# Patient Record
Sex: FEMALE | Race: WHITE | Employment: UNEMPLOYED | URBAN - METROPOLITAN AREA
[De-identification: names, ages, dates, MRNs, and addresses within clinical notes are randomized per-mention and may not be internally consistent; named-entity substitution may affect disease eponyms.]

---

## 2021-09-30 ENCOUNTER — HOSPITAL ENCOUNTER (EMERGENCY)
Age: 31
Discharge: HOME | End: 2021-09-30
Attending: EMERGENCY MEDICINE
Payer: COMMERCIAL

## 2021-09-30 ENCOUNTER — APPOINTMENT (OUTPATIENT)
Dept: CT IMAGING | Age: 31
End: 2021-09-30
Attending: EMERGENCY MEDICINE
Payer: COMMERCIAL

## 2021-09-30 VITALS
HEART RATE: 105 BPM | WEIGHT: 143 LBS | HEIGHT: 62 IN | DIASTOLIC BLOOD PRESSURE: 78 MMHG | BODY MASS INDEX: 26.31 KG/M2 | RESPIRATION RATE: 16 BRPM | OXYGEN SATURATION: 100 % | SYSTOLIC BLOOD PRESSURE: 119 MMHG | TEMPERATURE: 99.1 F

## 2021-09-30 DIAGNOSIS — R10.13 ABDOMINAL PAIN, EPIGASTRIC: ICD-10-CM

## 2021-09-30 DIAGNOSIS — N39.0 URINARY TRACT INFECTION WITHOUT HEMATURIA, SITE UNSPECIFIED: Primary | ICD-10-CM

## 2021-09-30 LAB
ALBUMIN SERPL-MCNC: 3.8 G/DL (ref 3.4–5)
ALBUMIN/GLOB SERPL: 1 {RATIO} (ref 0.8–1.7)
ALP SERPL-CCNC: 82 U/L (ref 45–117)
ALT SERPL-CCNC: 15 U/L (ref 13–56)
AMPHET UR QL SCN: POSITIVE
ANION GAP SERPL CALC-SCNC: 7 MMOL/L (ref 3–18)
APAP SERPL-MCNC: <2 UG/ML (ref 10–30)
APPEARANCE UR: CLEAR
AST SERPL-CCNC: 11 U/L (ref 10–38)
BACTERIA URNS QL MICRO: ABNORMAL /HPF
BARBITURATES UR QL SCN: NEGATIVE
BASOPHILS # BLD: 0 K/UL (ref 0–0.1)
BASOPHILS NFR BLD: 0 % (ref 0–2)
BENZODIAZ UR QL: NEGATIVE
BILIRUB SERPL-MCNC: 0.3 MG/DL (ref 0.2–1)
BILIRUB UR QL: NEGATIVE
BUN SERPL-MCNC: 5 MG/DL (ref 7–18)
BUN/CREAT SERPL: 9 (ref 12–20)
CALCIUM SERPL-MCNC: 9 MG/DL (ref 8.5–10.1)
CANNABINOIDS UR QL SCN: NEGATIVE
CHLORIDE SERPL-SCNC: 102 MMOL/L (ref 100–111)
CO2 SERPL-SCNC: 29 MMOL/L (ref 21–32)
COCAINE UR QL SCN: NEGATIVE
COLOR UR: YELLOW
CREAT SERPL-MCNC: 0.56 MG/DL (ref 0.6–1.3)
DIFFERENTIAL METHOD BLD: ABNORMAL
EOSINOPHIL # BLD: 0 K/UL (ref 0–0.4)
EOSINOPHIL NFR BLD: 0 % (ref 0–5)
EPITH CASTS URNS QL MICRO: ABNORMAL /LPF (ref 0–5)
ERYTHROCYTE [DISTWIDTH] IN BLOOD BY AUTOMATED COUNT: 11.9 % (ref 11.6–14.5)
GLOBULIN SER CALC-MCNC: 4 G/DL (ref 2–4)
GLUCOSE SERPL-MCNC: 92 MG/DL (ref 74–99)
GLUCOSE UR STRIP.AUTO-MCNC: NEGATIVE MG/DL
HCG SERPL QL: NEGATIVE
HCT VFR BLD AUTO: 37.6 % (ref 35–45)
HDSCOM,HDSCOM: ABNORMAL
HGB BLD-MCNC: 13 G/DL (ref 12–16)
HGB UR QL STRIP: NEGATIVE
KETONES UR QL STRIP.AUTO: 80 MG/DL
LEUKOCYTE ESTERASE UR QL STRIP.AUTO: ABNORMAL
LIPASE SERPL-CCNC: 78 U/L (ref 73–393)
LYMPHOCYTES # BLD: 1.8 K/UL (ref 0.9–3.6)
LYMPHOCYTES NFR BLD: 13 % (ref 21–52)
MCH RBC QN AUTO: 28.5 PG (ref 24–34)
MCHC RBC AUTO-ENTMCNC: 34.6 G/DL (ref 31–37)
MCV RBC AUTO: 82.5 FL (ref 78–100)
METHADONE UR QL: NEGATIVE
MONOCYTES # BLD: 1 K/UL (ref 0.05–1.2)
MONOCYTES NFR BLD: 7 % (ref 3–10)
MUCOUS THREADS URNS QL MICRO: ABNORMAL /LPF
NEUTS SEG # BLD: 10.9 K/UL (ref 1.8–8)
NEUTS SEG NFR BLD: 79 % (ref 40–73)
NITRITE UR QL STRIP.AUTO: NEGATIVE
OPIATES UR QL: NEGATIVE
PCP UR QL: NEGATIVE
PH UR STRIP: 5.5 [PH] (ref 5–8)
PLATELET # BLD AUTO: 345 K/UL (ref 135–420)
PMV BLD AUTO: 9.8 FL (ref 9.2–11.8)
POTASSIUM SERPL-SCNC: 3.4 MMOL/L (ref 3.5–5.5)
PROT SERPL-MCNC: 7.8 G/DL (ref 6.4–8.2)
PROT UR STRIP-MCNC: NEGATIVE MG/DL
RBC # BLD AUTO: 4.56 M/UL (ref 4.2–5.3)
RBC #/AREA URNS HPF: ABNORMAL /HPF (ref 0–5)
SALICYLATES SERPL-MCNC: <1.7 MG/DL (ref 2.8–20)
SODIUM SERPL-SCNC: 138 MMOL/L (ref 136–145)
SP GR UR REFRACTOMETRY: 1.02 (ref 1–1.03)
UROBILINOGEN UR QL STRIP.AUTO: 1 EU/DL (ref 0.2–1)
WBC # BLD AUTO: 13.9 K/UL (ref 4.6–13.2)
WBC URNS QL MICRO: ABNORMAL /HPF (ref 0–4)

## 2021-09-30 PROCEDURE — 80179 DRUG ASSAY SALICYLATE: CPT

## 2021-09-30 PROCEDURE — 80307 DRUG TEST PRSMV CHEM ANLYZR: CPT

## 2021-09-30 PROCEDURE — 80143 DRUG ASSAY ACETAMINOPHEN: CPT

## 2021-09-30 PROCEDURE — 85025 COMPLETE CBC W/AUTO DIFF WBC: CPT

## 2021-09-30 PROCEDURE — 74011250636 HC RX REV CODE- 250/636: Performed by: EMERGENCY MEDICINE

## 2021-09-30 PROCEDURE — 96375 TX/PRO/DX INJ NEW DRUG ADDON: CPT

## 2021-09-30 PROCEDURE — 99284 EMERGENCY DEPT VISIT MOD MDM: CPT

## 2021-09-30 PROCEDURE — 83690 ASSAY OF LIPASE: CPT

## 2021-09-30 PROCEDURE — 96374 THER/PROPH/DIAG INJ IV PUSH: CPT

## 2021-09-30 PROCEDURE — 74011000250 HC RX REV CODE- 250: Performed by: EMERGENCY MEDICINE

## 2021-09-30 PROCEDURE — 80053 COMPREHEN METABOLIC PANEL: CPT

## 2021-09-30 PROCEDURE — 74177 CT ABD & PELVIS W/CONTRAST: CPT

## 2021-09-30 PROCEDURE — 96361 HYDRATE IV INFUSION ADD-ON: CPT

## 2021-09-30 PROCEDURE — 84703 CHORIONIC GONADOTROPIN ASSAY: CPT

## 2021-09-30 PROCEDURE — 81001 URINALYSIS AUTO W/SCOPE: CPT

## 2021-09-30 PROCEDURE — 93005 ELECTROCARDIOGRAM TRACING: CPT

## 2021-09-30 PROCEDURE — 74011000636 HC RX REV CODE- 636: Performed by: EMERGENCY MEDICINE

## 2021-09-30 RX ORDER — ONDANSETRON 2 MG/ML
4 INJECTION INTRAMUSCULAR; INTRAVENOUS
Status: COMPLETED | OUTPATIENT
Start: 2021-09-30 | End: 2021-09-30

## 2021-09-30 RX ORDER — FAMOTIDINE 20 MG/1
20 TABLET, FILM COATED ORAL 2 TIMES DAILY
Qty: 10 TABLET | Refills: 0 | Status: SHIPPED | OUTPATIENT
Start: 2021-09-30 | End: 2021-10-05

## 2021-09-30 RX ORDER — NITROFURANTOIN 25; 75 MG/1; MG/1
100 CAPSULE ORAL 2 TIMES DAILY
Qty: 10 CAPSULE | Refills: 0 | Status: SHIPPED | OUTPATIENT
Start: 2021-09-30 | End: 2021-10-05

## 2021-09-30 RX ORDER — ONDANSETRON 4 MG/1
4 TABLET, FILM COATED ORAL
Qty: 12 TABLET | Refills: 0 | Status: SHIPPED | OUTPATIENT
Start: 2021-09-30

## 2021-09-30 RX ADMIN — ONDANSETRON 4 MG: 2 INJECTION INTRAMUSCULAR; INTRAVENOUS at 20:18

## 2021-09-30 RX ADMIN — SODIUM CHLORIDE 1000 ML: 900 INJECTION, SOLUTION INTRAVENOUS at 20:18

## 2021-09-30 RX ADMIN — FAMOTIDINE 20 MG: 10 INJECTION INTRAVENOUS at 20:16

## 2021-09-30 RX ADMIN — IOPAMIDOL 100 ML: 612 INJECTION, SOLUTION INTRAVENOUS at 21:08

## 2021-09-30 NOTE — ED TRIAGE NOTES
Patient presents to ER for C/O epigastric pain x 2 days, states pain is a stabbing pain and she can only tolerate fluids. States she it has been 2 days since she was able to eat.

## 2021-10-01 LAB
ATRIAL RATE: 102 BPM
CALCULATED P AXIS, ECG09: 51 DEGREES
CALCULATED R AXIS, ECG10: 40 DEGREES
CALCULATED T AXIS, ECG11: 18 DEGREES
DIAGNOSIS, 93000: NORMAL
P-R INTERVAL, ECG05: 128 MS
Q-T INTERVAL, ECG07: 328 MS
QRS DURATION, ECG06: 74 MS
QTC CALCULATION (BEZET), ECG08: 427 MS
VENTRICULAR RATE, ECG03: 102 BPM

## 2021-10-01 NOTE — ED NOTES
I have reviewed discharge instructions with the patient. The patient verbalized understanding. Patient armband removed and shredded. Condition stable. Patient education was completed. Patient education was taught to patient using discussion and handout. Patient verbalized understanding. Patient was discharged to home by self. Patient was discharged ambulatory.

## 2021-10-01 NOTE — ED PROVIDER NOTES
EMERGENCY DEPARTMENT HISTORY AND PHYSICAL EXAM    8:09 PM  Date: 9/30/2021  Patient Name: Kimberly Zarate    History of Presenting Illness       History Provided By  [facility]: Kimberly Zarate is a 36 y.o. female with no past medical history presents with epigastric pain that started 2 days ago after accidentally swallowing  chewing gum. Patient states that the pain is sharp, moderate in severity comes and go, is worse when she eats accompanied by nausea. .  Patient had a bowel movement, today passes gas. Patient denies any urinary symptoms. Denies any vaginal discharge. Denies any overdose. PCP: Keke Mason MD    Past History     Past Medical History:  Past Medical History:   Diagnosis Date    Overdose of antipsychotic 3/6/13    Somnolence 3/6/13       Past Surgical History:  No past surgical history on file. Family History:  No family history on file. Social History:  Social History     Tobacco Use    Smoking status: Never Smoker    Smokeless tobacco: Never Used   Substance Use Topics    Alcohol use: No    Drug use: No       Allergies:  No Known Allergies    Review of Systems   Review of Systems   Constitutional: Negative for activity change, appetite change and chills. HENT: Negative for congestion, ear discharge, ear pain and sore throat. Eyes: Negative for photophobia and pain. Respiratory: Negative for cough and choking. Cardiovascular: Negative for palpitations and leg swelling. Gastrointestinal: Positive for abdominal pain. Negative for anal bleeding and rectal pain. Endocrine: Negative for polydipsia and polyuria. Genitourinary: Negative for genital sores and urgency. Musculoskeletal: Negative for arthralgias and myalgias. Neurological: Negative for dizziness, seizures and speech difficulty. Psychiatric/Behavioral: Negative for hallucinations, self-injury and suicidal ideas.         Physical Exam     Patient Vitals for the past 12 hrs:   Temp Pulse Resp BP SpO2 09/30/21 1955 99.1 °F (37.3 °C) (!) 105 16 (!) 135/91 98 %       Physical Exam  Vitals and nursing note reviewed. Constitutional:       Appearance: She is well-developed. HENT:      Head: Normocephalic and atraumatic. Eyes:      General:         Right eye: No discharge. Left eye: No discharge. Cardiovascular:      Rate and Rhythm: Normal rate and regular rhythm. Heart sounds: Normal heart sounds. No murmur heard. Pulmonary:      Effort: Pulmonary effort is normal. No respiratory distress. Breath sounds: Normal breath sounds. No stridor. No wheezing or rales. Chest:      Chest wall: No tenderness. Abdominal:      General: Bowel sounds are normal. There is no distension. Palpations: Abdomen is soft. Tenderness: There is no abdominal tenderness. There is no guarding or rebound. Musculoskeletal:         General: Normal range of motion. Cervical back: Normal range of motion and neck supple. Skin:     General: Skin is warm and dry. Neurological:      Mental Status: She is alert and oriented to person, place, and time. Diagnostic Study Results     Labs -  No results found for this or any previous visit (from the past 12 hour(s)). Radiologic Studies -   No results found. Medical Decision Making     ED Course: Progress Notes, Reevaluation, and Consults:    8:09 PM Initial assessment performed. The patients presenting problems have been discussed, and they/their family are in agreement with the care plan formulated and outlined with them. I have encouraged them to ask questions as they arise throughout their visit.       Provider Notes (Medical Decision Making):   Patient presents with epigastric pain after swallowing chewing gum  Patient will be checked for salicylate and Tylenol since she has a history of antipsychotic overdose given though denies taking any medication  Plan to obtain labs, imaging  Old medical records reviewed:  EKG as interpreted by me: 102, sinus tachycardia, no ST changes, normal intervals  Labs as interpreted by me:  Mild leukocytosis white cell count 13.9 no elevated creatinine  No elevated lipase  UA positive for UTI  Patient received famotidine, IV fluids, antiemetics  Acetaminophen and salicylate levels negative  Feels better on reassessment  Abdomen soft nontender  CT abdomen pelvis with no acute abnormality  Abdomen  Soft non tender on reassessment, patient pain-free  Patient will be discharged with antibiotics for UTI  Patient advised to follow-up with PMD and GI if symptoms persist          Vital Signs-Reviewed the patient's vital signs. Reviewed pt's pulse ox reading. Records Reviewed: old medical records  -I am the first provider for this patient.  -I reviewed the vital signs, available nursing notes, past medical history, past surgical history, family history and social history. Current Facility-Administered Medications   Medication Dose Route Frequency Provider Last Rate Last Admin    famotidine (PF) (PEPCID) 20 mg in 0.9% sodium chloride 10 mL injection  20 mg IntraVENous NOW Rosita Cedeno MD        ondansetron (ZOFRAN) injection 4 mg  4 mg IntraVENous NOW Rosita Cedeno MD        sodium chloride 0.9 % bolus infusion 1,000 mL  1,000 mL IntraVENous ONCE Rosita Cedeno MD            Clinical Impression     Clinical Impression: No diagnosis found. Disposition:  Pt has been reexamined. Patient has no new complaints, changes, or physical findings. Care plan outlined and precautions discussed. Results were reviewed with the patient. All medications were reviewed with the patient; will d/c home with PMD f/u. All of pt's questions and concerns were addressed. Patient was instructed and agrees to follow up with PMD, as well as to return to the ED upon further deterioration. Patient is ready to go home. At this time, patient is stable and appropriate for discharge home.   Patient demonstrates understanding of current diagnoses and is in agreement with the treatment plan. They are advised that while the likelihood of serious underlying condition is low at this point given the evaluation performed today, we cannot fully rule it out. They are advised to immediately return with any new symptoms or worsening of current condition. Any Incidental findings were noted on the patient's discharge paperwork as well as verbally directly to the patient, and the appropriate follow-up was given to the patient as far as instructions on testing needed as well as the timeframe. All questions have been answered. Patient is given educational material regarding their diagnoses, including danger symptoms and when to return to the ED. This note was dictated utilizing voice recognition software which may lead to typographical errors. I apologize in advance if the situation occurs. If questions arise please do not hesitate to contact me or call our department.     Luna Gallagher MD  8:09 PM

## 2022-03-22 ENCOUNTER — APPOINTMENT (OUTPATIENT)
Dept: GENERAL RADIOLOGY | Age: 32
End: 2022-03-22
Attending: PHYSICIAN ASSISTANT
Payer: COMMERCIAL

## 2022-03-22 ENCOUNTER — HOSPITAL ENCOUNTER (EMERGENCY)
Age: 32
Discharge: HOME | End: 2022-03-23
Attending: EMERGENCY MEDICINE
Payer: COMMERCIAL

## 2022-03-22 DIAGNOSIS — S83.92XA SPRAIN OF LEFT KNEE, UNSPECIFIED LIGAMENT, INITIAL ENCOUNTER: ICD-10-CM
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
## 2022-03-22 DIAGNOSIS — S60.222A CONTUSION OF LEFT HAND, INITIAL ENCOUNTER: ICD-10-CM

## 2022-03-22 PROCEDURE — 99284 EMERGENCY DEPT VISIT MOD MDM: CPT

## 2022-03-22 PROCEDURE — 73130 X-RAY EXAM OF HAND: CPT

## 2022-03-22 PROCEDURE — 90471 IMMUNIZATION ADMIN: CPT

## 2022-03-22 PROCEDURE — 74011250636 HC RX REV CODE- 250/636: Performed by: PHYSICIAN ASSISTANT

## 2022-03-22 PROCEDURE — 90715 TDAP VACCINE 7 YRS/> IM: CPT | Performed by: PHYSICIAN ASSISTANT

## 2022-03-22 PROCEDURE — 74011250637 HC RX REV CODE- 250/637: Performed by: PHYSICIAN ASSISTANT

## 2022-03-22 PROCEDURE — 73564 X-RAY EXAM KNEE 4 OR MORE: CPT

## 2022-03-22 RX ORDER — HYDROCODONE BITARTRATE AND ACETAMINOPHEN 5; 325 MG/1; MG/1
1 TABLET ORAL
Status: COMPLETED | OUTPATIENT
Start: 2022-03-22 | End: 2022-03-22

## 2022-03-22 RX ADMIN — TETANUS TOXOID, REDUCED DIPHTHERIA TOXOID AND ACELLULAR PERTUSSIS VACCINE, ADSORBED 0.5 ML: 5; 2.5; 8; 8; 2.5 SUSPENSION INTRAMUSCULAR at 20:33

## 2022-03-22 RX ADMIN — HYDROCODONE BITARTRATE AND ACETAMINOPHEN 1 TABLET: 5; 325 TABLET ORAL at 20:33
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
## 2022-03-23 ENCOUNTER — HOSPITAL ENCOUNTER (EMERGENCY)
Dept: VASCULAR SURGERY | Age: 32
End: 2022-03-23
Attending: PHYSICIAN ASSISTANT
Payer: COMMERCIAL

## 2022-03-23 VITALS
WEIGHT: 150 LBS | DIASTOLIC BLOOD PRESSURE: 94 MMHG | SYSTOLIC BLOOD PRESSURE: 142 MMHG | HEART RATE: 81 BPM | HEIGHT: 62 IN | TEMPERATURE: 98.6 F | OXYGEN SATURATION: 97 % | BODY MASS INDEX: 27.6 KG/M2 | RESPIRATION RATE: 20 BRPM

## 2022-03-23 RX ORDER — NAPROXEN 500 MG/1
500 TABLET ORAL 2 TIMES DAILY WITH MEALS
Qty: 20 TABLET | Refills: 0 | Status: SHIPPED | OUTPATIENT
Start: 2022-03-23

## 2022-11-08 ENCOUNTER — HOSPITAL ENCOUNTER (INPATIENT)
Age: 32
LOS: 5 days | Discharge: HOME | DRG: 751 | End: 2022-11-14
Attending: EMERGENCY MEDICINE | Admitting: PSYCHIATRY & NEUROLOGY
Payer: MEDICAID

## 2022-11-08 DIAGNOSIS — F32.A DEPRESSION, UNSPECIFIED DEPRESSION TYPE: Primary | ICD-10-CM

## 2022-11-08 LAB
ALBUMIN SERPL-MCNC: 4 G/DL (ref 3.4–5)
ALBUMIN/GLOB SERPL: 1 {RATIO} (ref 0.8–1.7)
ALP SERPL-CCNC: 89 U/L (ref 45–117)
ALT SERPL-CCNC: 18 U/L (ref 13–56)
AMORPH CRY URNS QL MICRO: ABNORMAL
AMPHET UR QL SCN: POSITIVE
ANION GAP SERPL CALC-SCNC: 4 MMOL/L (ref 3–18)
APPEARANCE UR: ABNORMAL
AST SERPL-CCNC: 11 U/L (ref 10–38)
BACTERIA URNS QL MICRO: ABNORMAL /HPF
BARBITURATES UR QL SCN: NEGATIVE
BASOPHILS # BLD: 0 K/UL (ref 0–0.1)
BASOPHILS NFR BLD: 0 % (ref 0–2)
BENZODIAZ UR QL: NEGATIVE
BILIRUB SERPL-MCNC: 0.6 MG/DL (ref 0.2–1)
BILIRUB UR QL: NEGATIVE
BUN SERPL-MCNC: 10 MG/DL (ref 7–18)
BUN/CREAT SERPL: 16 (ref 12–20)
CALCIUM SERPL-MCNC: 9.4 MG/DL (ref 8.5–10.1)
CANNABINOIDS UR QL SCN: NEGATIVE
CHLORIDE SERPL-SCNC: 104 MMOL/L (ref 100–111)
CO2 SERPL-SCNC: 29 MMOL/L (ref 21–32)
COCAINE UR QL SCN: NEGATIVE
COLOR UR: ABNORMAL
CREAT SERPL-MCNC: 0.64 MG/DL (ref 0.6–1.3)
DIFFERENTIAL METHOD BLD: NORMAL
EOSINOPHIL # BLD: 0 K/UL (ref 0–0.4)
EOSINOPHIL NFR BLD: 0 % (ref 0–5)
EPITH CASTS URNS QL MICRO: ABNORMAL /LPF (ref 0–5)
ERYTHROCYTE [DISTWIDTH] IN BLOOD BY AUTOMATED COUNT: 12.3 % (ref 11.6–14.5)
ETHANOL SERPL-MCNC: <3 MG/DL (ref 0–3)
FLUAV RNA SPEC QL NAA+PROBE: NOT DETECTED
FLUBV RNA SPEC QL NAA+PROBE: NOT DETECTED
GLOBULIN SER CALC-MCNC: 4 G/DL (ref 2–4)
GLUCOSE SERPL-MCNC: 100 MG/DL (ref 74–99)
GLUCOSE UR STRIP.AUTO-MCNC: NEGATIVE MG/DL
HCG SERPL QL: NEGATIVE
HCT VFR BLD AUTO: 40.3 % (ref 35–45)
HDSCOM,HDSCOM: ABNORMAL
HGB BLD-MCNC: 13.6 G/DL (ref 12–16)
HGB UR QL STRIP: NEGATIVE
IMM GRANULOCYTES # BLD AUTO: 0 K/UL (ref 0–0.04)
IMM GRANULOCYTES NFR BLD AUTO: 0 % (ref 0–0.5)
KETONES UR QL STRIP.AUTO: ABNORMAL MG/DL
LEUKOCYTE ESTERASE UR QL STRIP.AUTO: ABNORMAL
LYMPHOCYTES # BLD: 3.1 K/UL (ref 0.9–3.6)
LYMPHOCYTES NFR BLD: 27 % (ref 21–52)
MCH RBC QN AUTO: 28.6 PG (ref 24–34)
MCHC RBC AUTO-ENTMCNC: 33.7 G/DL (ref 31–37)
MCV RBC AUTO: 84.7 FL (ref 78–100)
METHADONE UR QL: NEGATIVE
MONOCYTES # BLD: 0.8 K/UL (ref 0.05–1.2)
MONOCYTES NFR BLD: 7 % (ref 3–10)
MUCOUS THREADS URNS QL MICRO: ABNORMAL /LPF
NEUTS SEG # BLD: 7.6 K/UL (ref 1.8–8)
NEUTS SEG NFR BLD: 66 % (ref 40–73)
NITRITE UR QL STRIP.AUTO: NEGATIVE
NRBC # BLD: 0 K/UL (ref 0–0.01)
NRBC BLD-RTO: 0 PER 100 WBC
OPIATES UR QL: NEGATIVE
PCP UR QL: NEGATIVE
PH UR STRIP: 5.5 [PH] (ref 5–8)
PLATELET # BLD AUTO: 408 K/UL (ref 135–420)
PMV BLD AUTO: 9.6 FL (ref 9.2–11.8)
POTASSIUM SERPL-SCNC: 3.3 MMOL/L (ref 3.5–5.5)
PROT SERPL-MCNC: 8 G/DL (ref 6.4–8.2)
PROT UR STRIP-MCNC: ABNORMAL MG/DL
RBC # BLD AUTO: 4.76 M/UL (ref 4.2–5.3)
RBC #/AREA URNS HPF: ABNORMAL /HPF (ref 0–5)
SARS-COV-2, COV2: NOT DETECTED
SODIUM SERPL-SCNC: 137 MMOL/L (ref 136–145)
SP GR UR REFRACTOMETRY: 1.03 (ref 1–1.03)
UROBILINOGEN UR QL STRIP.AUTO: 1 EU/DL (ref 0.2–1)
WBC # BLD AUTO: 11.7 K/UL (ref 4.6–13.2)
WBC URNS QL MICRO: ABNORMAL /HPF (ref 0–4)

## 2022-11-08 PROCEDURE — 82077 ASSAY SPEC XCP UR&BREATH IA: CPT

## 2022-11-08 PROCEDURE — 80307 DRUG TEST PRSMV CHEM ANLYZR: CPT

## 2022-11-08 PROCEDURE — 90791 PSYCH DIAGNOSTIC EVALUATION: CPT

## 2022-11-08 PROCEDURE — 84703 CHORIONIC GONADOTROPIN ASSAY: CPT

## 2022-11-08 PROCEDURE — 74011250637 HC RX REV CODE- 250/637: Performed by: EMERGENCY MEDICINE

## 2022-11-08 PROCEDURE — 99285 EMERGENCY DEPT VISIT HI MDM: CPT

## 2022-11-08 PROCEDURE — 80053 COMPREHEN METABOLIC PANEL: CPT

## 2022-11-08 PROCEDURE — 87636 SARSCOV2 & INF A&B AMP PRB: CPT

## 2022-11-08 PROCEDURE — 85025 COMPLETE CBC W/AUTO DIFF WBC: CPT

## 2022-11-08 PROCEDURE — 81001 URINALYSIS AUTO W/SCOPE: CPT

## 2022-11-08 RX ORDER — POTASSIUM CHLORIDE 20 MEQ/1
40 TABLET, EXTENDED RELEASE ORAL
Status: COMPLETED | OUTPATIENT
Start: 2022-11-08 | End: 2022-11-08

## 2022-11-08 RX ADMIN — POTASSIUM CHLORIDE 40 MEQ: 1500 TABLET, EXTENDED RELEASE ORAL at 20:43

## 2022-11-08 NOTE — ED TRIAGE NOTES
Patient arrived her by self for SI,states after son left states plan to \"take all her medication\", patient also has been cutting herself with a \"daniel\" razor noted superficial cuts abdomen. Patient cooperative and calm.  Will obtain labs at at time

## 2022-11-09 PROBLEM — F32.A DEPRESSIVE DISORDER: Status: ACTIVE | Noted: 2022-11-09

## 2022-11-09 PROCEDURE — 74011250637 HC RX REV CODE- 250/637: Performed by: PSYCHIATRY & NEUROLOGY

## 2022-11-09 PROCEDURE — 65220000003 HC RM SEMIPRIVATE PSYCH

## 2022-11-09 RX ORDER — BENZTROPINE MESYLATE 1 MG/1
1 TABLET ORAL
Status: DISCONTINUED | OUTPATIENT
Start: 2022-11-09 | End: 2022-11-14 | Stop reason: HOSPADM

## 2022-11-09 RX ORDER — HALOPERIDOL 5 MG/1
5 TABLET ORAL
Status: DISCONTINUED | OUTPATIENT
Start: 2022-11-09 | End: 2022-11-14 | Stop reason: HOSPADM

## 2022-11-09 RX ORDER — ACETAMINOPHEN 325 MG/1
650 TABLET ORAL
Status: DISCONTINUED | OUTPATIENT
Start: 2022-11-09 | End: 2022-11-14 | Stop reason: HOSPADM

## 2022-11-09 RX ORDER — TRAZODONE HYDROCHLORIDE 50 MG/1
50 TABLET ORAL
Status: DISCONTINUED | OUTPATIENT
Start: 2022-11-09 | End: 2022-11-14 | Stop reason: HOSPADM

## 2022-11-09 RX ORDER — HYDROXYZINE PAMOATE 25 MG/1
25 CAPSULE ORAL
Status: DISCONTINUED | OUTPATIENT
Start: 2022-11-09 | End: 2022-11-14 | Stop reason: HOSPADM

## 2022-11-09 RX ADMIN — TRAZODONE HYDROCHLORIDE 50 MG: 50 TABLET ORAL at 20:12

## 2022-11-09 RX ADMIN — HYDROXYZINE PAMOATE 25 MG: 25 CAPSULE ORAL at 20:12
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
## 2022-11-09 NOTE — ED NOTES
TRANSFER - OUT REPORT:    Verbal report given to Valery Estevez (name) on Bonnie Vincent  being transferred to Bed 126/2(unit) for routine progression of care       Report consisted of patients Situation, Background, Assessment and   Recommendations(SBAR). Information from the following report(s) SBAR, ED Summary and MAR was reviewed with the receiving nurse. Lines:       Opportunity for questions and clarification was provided.       Patient awaiting transport and will go up at [phone]

## 2022-11-09 NOTE — BSMART NOTE
Crisis Note: Patient has been accepted for inpatient treatment at [facility] by Dr. Ulysses Amsterdam.

## 2022-11-09 NOTE — ACP (ADVANCE CARE PLANNING)
Advance Care Planning   Advance Care Planning Inpatient Note  [address]    Today's Date: 11/9/2022  Unit: [facility] EMERGENCY DEPT    Received request from . Upon review of chart and communication with care team, patient's decision making abilities are not in question. Patient was/were present in the room during visit. Goals of ACP Conversation:  Discuss Advance Care planning documents    Health Care Decision Makers:    No healthcare decision makers have been documented. Click here to complete [address] including selection of the Healthcare Decision Maker Relationship (ie \"Primary\")  Summary:  No Decision Maker named by patient at this time    Advance Care Planning Documents (Patient Wishes) on file:  None     Assessment:    Patient seen as a new admit to the hospital in from bed 20 to the behavioral med unit. Patient awaiting clearance and admission orders, There is no advance directive.     Interventions:  Deferred conversation as patient not interested in completing an advance directive at this time    Care Preferences Communicated:  No    Outcomes/Plan:      [facility] on 11/9/2022 at 11:12 AM

## 2022-11-09 NOTE — ED NOTES
Patient brought back to room and changed into paper scrubs. Patient has 2 bags of belongings and they are placed in locker 5A.

## 2022-11-09 NOTE — BSMART NOTE
Behavioral Health Crisis Assessment    Chief Complaint: Mental Health/ SI       Voluntary or Involuntary Status: Voluntarily      C-SSRS current suicide Risk (High, Moderate, Low): high      Past Suicide Attempts:  (specify): Patient stated, \"In 2012 or 2013, I tried to overdosed on medications\". Self Injurious/Self Mutilation behaviors (specify): Patient does cut herself to stop/numb the pain. Protective Factors (specify): \"My mother or my friend Carmencita\". Risk Factors (specify): Mental health, self-mutilation       Substance use (current or past):  Denied. MH & Substance use Treatment  (current and/or past):Denied. Violence towards others (current and/or past:(specify): Denied. Legal issues (current or past): Denied. Access to weapons: Patient stated she had a razor blade. Trauma or Abuse: (specify): Denied      Living Situation: Patient lives with mother and brother. Employment: Patient is currently unemployed. Education level: Patient has a 10th grade education      Mental Status Exam: Patient presented as appropriate, alert and oriented to person, place, time and situation. Patient is wearing hospital paper scrubs. Patient had appropriate posture, Good eye contact and presented with cooperative attitude, Euthymic  mood and Helpless and Hopeless affect. Thought process was Clear and Coherent with No evidence of impairment content. Memory shows no evidence of impairment. Patient's speech was Slow and Soft. Judgement is Fair and insight is fair. Brief Clinical Summary: Patient is a 28 y/o Female. Patient arrived to [facility] ED via EMS due to suicidal ideation and self-mutilating behavior. Pt endorses SI. Pt denied HI/AH/VH/lacked evidence of delusions. Patient reported that she has been feeling suicidal on and off and has thoughts about sleeping aid medications. Patient does not take any medications. Patient does not have a PCP. Patient kitchen not have any outpatient services in place. Patient reported that she does have difficulties with with sleep and her eating habits/appetite is good. Patient did not have any questions at the end of the assessment. Disposition: Patient is receptive to inpatient treatment.  Will discuss with on-call psychiatrist.

## 2022-11-09 NOTE — PROGRESS NOTES
completed the initial Spiritual Assessment of the patient in bed 20 of the emergency room as she waits for clearance and admission orders to the behavioral med unit. Patient was asked about her having an advance directive and she said no. Patient does not have any Zoroastrian/cultural needs that will affect patients preferences in health care. Chaplains will continue to follow and will provide pastoral care on an as needed/requested basis.     Yury Santiago  [facility] Care Department  [phone]

## 2022-11-10 PROBLEM — F41.0 GENERALIZED ANXIETY DISORDER WITH PANIC ATTACKS: Status: ACTIVE | Noted: 2022-11-10

## 2022-11-10 PROBLEM — F41.0 PANIC DISORDER: Status: RESOLVED | Noted: 2022-11-10 | Resolved: 2022-11-10

## 2022-11-10 PROBLEM — F41.0 PANIC DISORDER: Status: ACTIVE | Noted: 2022-11-10

## 2022-11-10 PROBLEM — F32.2 MAJOR DEPRESSIVE DISORDER, SINGLE EPISODE, SEVERE WITHOUT PSYCHOTIC FEATURES (HCC): Status: ACTIVE | Noted: 2022-11-09

## 2022-11-10 PROBLEM — F41.1 GENERALIZED ANXIETY DISORDER WITH PANIC ATTACKS: Status: ACTIVE | Noted: 2022-11-10

## 2022-11-10 PROCEDURE — 74011250637 HC RX REV CODE- 250/637: Performed by: PSYCHIATRY & NEUROLOGY

## 2022-11-10 PROCEDURE — 99223 1ST HOSP IP/OBS HIGH 75: CPT | Performed by: PSYCHIATRY & NEUROLOGY

## 2022-11-10 PROCEDURE — 65220000003 HC RM SEMIPRIVATE PSYCH

## 2022-11-10 RX ORDER — PAROXETINE HYDROCHLORIDE 20 MG/1
20 TABLET, FILM COATED ORAL DAILY
Status: DISCONTINUED | OUTPATIENT
Start: 2022-11-10 | End: 2022-11-14 | Stop reason: HOSPADM

## 2022-11-10 RX ADMIN — PAROXETINE HYDROCHLORIDE 20 MG: 20 TABLET, FILM COATED ORAL at 14:12

## 2022-11-10 RX ADMIN — TRAZODONE HYDROCHLORIDE 50 MG: 50 TABLET ORAL at 20:49

## 2022-11-10 NOTE — BH NOTES
Pt appeared to have slept for 6 hours thus far. Will continue to monitor for safety and changes in behavior.

## 2022-11-10 NOTE — H&P
[address] [facility] [address]  Inpatient Admission Note    Date of Service:  11/10/22    Historian(s): Halina Rivera and chart review  Referral Source: [facility]    Chief Complaint   Suicidal ideation    History of Present Illness     Halina Rivera is a 29 y.o. WHITE/NON- female with a history of ADHD who was admitted voluntarily from our ED for suicidal ideation with a plan to cut herself. The patient has a history of cutting behavior and has been inflicting superficial lacerations on her upper extremities and abdomen. The patient is a limited historian and is very guarded with information. She made several conflicting statements during the interview. She is requesting to be discharged. Attempts were made to get collateral from her mother but her mother was also a poor historian. Her aunt provided some collateral information. According to ED notes, the patient presented to the ED endorsing suicidal ideation because she felt like everybody was against her. She said her mother was a major stressor in her life and that she was having anxiety and panic attacks. She reported a plan to overdose on medications. She also said that she had a cutter and had cut her left arm and abdomen. Collateral information from aunt indicates that the patient has been cutting herself for quite a while. The family first noted scars of cutting in the summertime. They feel that she has had an increase in cutting behavior. Aunt reports that she is more depressed and isolative. She is also more irritable and has not been sleeping well. Aunt said that the patient has panic attacks at night and she does not know if the panic attacks are inducing the cutting. Aunt reported that the patient is usually very guarded and quiet. She has not been working due to anxiety as she says she cannot be around people. In fact she has never had any job.   The patient's mother reported that the patient is depressed and withdrawn. She spends a lot of time in her room looking out the window and needs to be medicated for depression. The patient herself denied feeling depressed. She says she has been feeling \"more happy and hyper\" the past few months. She feels stressed out at times due to having to help her mother with her ADLs and states that being her mother's caregiver gets overwhelming at times. She is also worried about finances and bills. The patient was vague about when she started cutting but she says she last cut herself 2 weeks ago. She denied any other stressors. She reported poor sleep for the past 2 years since she broke up with her boyfriend. She says she states she gets only about 4 to 5 hours of sleep at night. She denied problems with appetite or energy level. She reported problems with concentration but states she has been diagnosed with ADHD in childhood. She endorsed irritability. The patient was screened for luis fernando but it was very difficult to get a history from her. She endorsed symptoms of sometimes being very talkative but said it only lasted a few hours. She said her brother had informed her that she was hyper and that she can get hyper at times. Her aunt was also asked about manic symptoms and did not endorse any manic symptoms. The aunt mentioned that the patient has been having panic attacks for the past 2 years. The patient herself did not want to disclose much about anxiety or panic attacks. The patient denies use of recreational drugs or alcohol although her UDS was positive for amphetamines. She says she has been prescribed Ritalin in childhood but had not taken it in a number of years. Medical Review of Systems     Constitutional: No fever or chills. All other systems reviewed and are negative except for what is mentioned in the HPI. Psychiatric Treatment History   This is the patient's first psychiatric hospitalization and she denies history of prior suicide attempts. However, she informed the crisis worker that she had tried to overdose on medications in 2012 or 2013. She denies outpatient treatment. She says she recently contacted an outpatient clinic to get an appointment in order to establish care but she has not been given the appointment yet and has not established care with anybody. Allergies    No Known Allergies    Medical History     Past Medical History:   Diagnosis Date    Overdose of antipsychotic 3/6/13    Somnolence 3/6/13        Medication(s)     Prior to Admission Medications   Prescriptions Last Dose Informant Patient Reported? Taking?   naproxen (NAPROSYN) 500 mg tablet Unknown  No No   Sig: Take 1 Tablet by mouth two (2) times daily (with meals). ondansetron hcl (Zofran) 4 mg tablet Unknown  No No   Sig: Take 1 Tablet by mouth every eight (8) hours as needed for Nausea. Facility-Administered Medications: None       Family History     No family history on file. Psychiatric Family History  Mother with history of depression and anxiety, causing schizoaffective disorder  Social History     The patient was born in [place] and raised in [place]. She says she is an only child but she grew up with another child that she currently calls her brother even though he is not her biological brother. She went as far as the 10th grade due to having a learning disability. She has never been employed. She is single and does not have any children. She currently lives with her mother and \"brother\" in [place].   Vitals/Labs      Vitals:    11/09/22 0306 11/09/22 0730 11/09/22 1659 11/10/22 0923   BP: 122/75 107/71 115/83 114/76   Pulse: 100 97 (!) 110 100   Resp: 18 18 18 16   Temp: 97.5 °F (36.4 °C) 98.5 °F (36.9 °C) 97.2 °F (36.2 °C) 97.2 °F (36.2 °C)   SpO2: 99% 99% 97% 97%   Weight:       Height:           Labs:   Results for orders placed or performed during the hospital encounter of 11/08/22   COVID-19 WITH INFLUENZA A/B   Result Value Ref Range SARS-CoV-2 by PCR Not detected NOTD      Influenza A by PCR Not detected NOTD      Influenza B by PCR Not detected NOTD     CBC WITH AUTOMATED DIFF   Result Value Ref Range    WBC 11.7 4.6 - 13.2 K/uL    RBC 4.76 4.20 - 5.30 M/uL    HGB 13.6 12.0 - 16.0 g/dL    HCT 40.3 35.0 - 45.0 %    MCV 84.7 78.0 - 100.0 FL    MCH 28.6 24.0 - 34.0 PG    MCHC 33.7 31.0 - 37.0 g/dL    RDW 12.3 11.6 - 14.5 %    PLATELET 478 827 - 207 K/uL    MPV 9.6 9.2 - 11.8 FL    NRBC 0.0 0  WBC    ABSOLUTE NRBC 0.00 0.00 - 0.01 K/uL    NEUTROPHILS 66 40 - 73 %    LYMPHOCYTES 27 21 - 52 %    MONOCYTES 7 3 - 10 %    EOSINOPHILS 0 0 - 5 %    BASOPHILS 0 0 - 2 %    IMMATURE GRANULOCYTES 0 0.0 - 0.5 %    ABS. NEUTROPHILS 7.6 1.8 - 8.0 K/UL    ABS. LYMPHOCYTES 3.1 0.9 - 3.6 K/UL    ABS. MONOCYTES 0.8 0.05 - 1.2 K/UL    ABS. EOSINOPHILS 0.0 0.0 - 0.4 K/UL    ABS. BASOPHILS 0.0 0.0 - 0.1 K/UL    ABS. IMM. GRANS. 0.0 0.00 - 0.04 K/UL    DF AUTOMATED     METABOLIC PANEL, COMPREHENSIVE   Result Value Ref Range    Sodium 137 136 - 145 mmol/L    Potassium 3.3 (L) 3.5 - 5.5 mmol/L    Chloride 104 100 - 111 mmol/L    CO2 29 21 - 32 mmol/L    Anion gap 4 3.0 - 18 mmol/L    Glucose 100 (H) 74 - 99 mg/dL    BUN 10 7.0 - 18 MG/DL    Creatinine 0.64 0.6 - 1.3 MG/DL    BUN/Creatinine ratio 16 12 - 20      eGFR >60 >60 ml/min/1.73m2    Calcium 9.4 8.5 - 10.1 MG/DL    Bilirubin, total 0.6 0.2 - 1.0 MG/DL    ALT (SGPT) 18 13 - 56 U/L    AST (SGOT) 11 10 - 38 U/L    Alk.  phosphatase 89 45 - 117 U/L    Protein, total 8.0 6.4 - 8.2 g/dL    Albumin 4.0 3.4 - 5.0 g/dL    Globulin 4.0 2.0 - 4.0 g/dL    A-G Ratio 1.0 0.8 - 1.7     URINALYSIS W/ RFLX MICROSCOPIC   Result Value Ref Range    Color DARK YELLOW      Appearance CLOUDY      Specific gravity 1.030 1.005 - 1.030      pH (UA) 5.5 5.0 - 8.0      Protein TRACE (A) NEG mg/dL    Glucose Negative NEG mg/dL    Ketone TRACE (A) NEG mg/dL    Bilirubin Negative NEG      Blood Negative NEG      Urobilinogen 1.0 0.2 - 1.0 EU/dL    Nitrites Negative NEG      Leukocyte Esterase LARGE (A) NEG     ETHYL ALCOHOL   Result Value Ref Range    ALCOHOL(ETHYL),SERUM <3 0 - 3 MG/DL   HCG QL SERUM   Result Value Ref Range    HCG, Ql. Negative NEG     DRUG SCREEN, URINE   Result Value Ref Range    BENZODIAZEPINES Negative NEG      BARBITURATES Negative NEG      THC (TH-CANNABINOL) Negative NEG      OPIATES Negative NEG      PCP(PHENCYCLIDINE) Negative NEG      COCAINE Negative NEG      AMPHETAMINES Positive (A) NEG      METHADONE Negative NEG      HDSCOM (NOTE)    URINE MICROSCOPIC ONLY   Result Value Ref Range    WBC 3 to 5 0 - 4 /hpf    RBC NONE 0 - 5 /hpf    Epithelial cells 4+ 0 - 5 /lpf    Bacteria 1+ (A) NEG /hpf    Mucus 4+ (A) NEG /lpf    Amorphous Crystals FEW (A) NEG         Mental Status Examination     Appearance/Hygiene 29 y.o.  WHITE/NON- female  Hygiene: Fair, dressed in hospital scrubs   Behavior/Social Relatedness Appropriate but guarded   Musculoskeletal Gait/Station: appropriate  Tone (flaccid, cogwheeling, spastic): not assessed  Psychomotor (hyperkinetic, hypokinetic): calm  Involuntary movements (tics, dyskinesias, akathisa, stereotypies): none   Speech   Rate, rhythm, volume, fluency and articulation are appropriate   Mood   euthymic   Affect    within normal limits   Thought Process Linear and concrete   Thought Content and Perceptual Disturbances Denies delusions, ideas of reference, overvalued ideas, ruminations, obsession, compulsions, and phobias    Denies self-injurious behavior (SIB), suicidal ideation (SI), aggressive behavior or homicidal ideation (HI)    Denies auditory and visual hallucinations   Sensorium and Cognition  A&Ox4, attention intact, memory intact, language use appropriate, and fund of knowledge age appropriate   Insight  limited   Judgment limited       Suicide Risk Assessment     Admission  Date/Time: 11/10/22  Risk factors for suicidality depressed mood, self-injurious behavior, history of prior suicide attempt. The patient currently denies suicidal ideation but is still deemed to be at least at a moderate acute risk of suicide. Assessment and Plan     Psychiatric Diagnoses:   Patient Active Problem List   Diagnosis Code    Major depressive disorder, single episode, severe without psychotic features ([facility] [address] F32.2    Generalized anxiety disorder with panic attacks F41.1, F41.0       Level of impairment/disability: Moderate to severe    Constantine Renae is a 29 y.o. who is currently requiring acute stabilization after endorsing suicidal ideation. Admit to locked inpatient behavioral health unit. Start milieu, group, art and occupation therapy. Start Paxil 20 mg daily for depression and anxiety. Routine labs ordered and reviewed by this provider. Reviewed instructions, risks, benefits and side effects. Start disposition planning; verify upcoming outpatient appointments with therapist and/or psychiatric medication prescriber. Tentative date of discharge: 3-5 days.        Clive Edwards MD  Psychiatrist  [facility]

## 2022-11-10 NOTE — H&P
Psychiatry History and Physical    Subjective:     Date of Evaluation:  11/10/2022    Reason for Referral:  Ariel Flores was referred to the examiners from ED for SI. History of Presenting Problem: 28 yo cauc female in NAD, well developed and nourished with hx of NATALIE and MDD who presented to the ED for SI. She endorses depression and anxiety. She denies SI, HI, AH, and VH. She denies physical symptoms today. Patient Active Problem List    Diagnosis Date Noted    Generalized anxiety disorder with panic attacks 11/10/2022    Major depressive disorder, single episode, severe without psychotic features ([facility] [address] 11/09/2022     Past Medical History:   Diagnosis Date    Overdose of antipsychotic 3/6/13    Somnolence 3/6/13     No past surgical history on file. No family history on file. Social History     Tobacco Use    Smoking status: Never    Smokeless tobacco: Never   Substance Use Topics    Alcohol use: No     Prior to Admission medications    Medication Sig Start Date End Date Taking? Authorizing Provider   naproxen (NAPROSYN) 500 mg tablet Take 1 Tablet by mouth two (2) times daily (with meals). 3/23/22   LUCY Smyth   ondansetron hcl (Zofran) 4 mg tablet Take 1 Tablet by mouth every eight (8) hours as needed for Nausea.  9/30/21   Burak Hale MD     No Known Allergies     Review of Systems - History obtained from chart review and the patient  General ROS: negative  Psychological ROS: positive for - anxiety and depression  Ophthalmic ROS: negative  ENT ROS: negative  Allergy and Immunology ROS: negative  Hematological and Lymphatic ROS: negative  Endocrine ROS: negative  Respiratory ROS: no cough, shortness of breath, or wheezing  Cardiovascular ROS: no chest pain or dyspnea on exertion  Gastrointestinal ROS: no abdominal pain, change in bowel habits, or black or bloody stools  Genito-Urinary ROS: no dysuria, trouble voiding, or hematuria  Musculoskeletal ROS: negative  Neurological ROS: no TIA or stroke symptoms  Dermatological ROS: negative      Objective:     No data found. Mental Status exam: WNL except for    Sensorium  Alert and Oriented x 2   Orientation person and place   Relations cooperative   Eye Contact appropriate   Appearance:  age appropriate   Motor Behavior:  within normal limits   Speech:  normal pitch, normal volume, and non-pressured   Vocabulary average   Thought Process: within normal limits   Thought Content free of delusions and free of hallucinations   Suicidal ideations no plan  and no intention   Homicidal ideations no plan  and no intention   Mood:  anxious   Affect:  mood-congruent   Memory recent  adequate   Memory remote:  adequate   Concentration:  adequate   Abstraction:  concrete   Insight:  good   Reliability good   Judgment:  good         Physical Exam:   Visit Vitals  /76 (BP 1 Location: Left upper arm, BP Patient Position: Sitting)   Pulse 100   Temp 97.2 °F (36.2 °C)   Resp 16   Ht 5' 1\" (1.549 m)   Wt 70.3 kg (155 lb)   SpO2 97%   Breastfeeding No   BMI 29.29 kg/m²     General:  Alert, cooperative, no distress, appears stated age. Head:  Normocephalic, without obvious abnormality, atraumatic. Eyes:  Conjunctivae/corneas clear. PERRL, EOMs intact. Fundi benign. Ears:  Normal TMs and external ear canals both ears. Nose: Nares normal. Septum midline. Mucosa normal. No drainage or sinus tenderness. Throat: Lips, mucosa, and tongue normal. Teeth and gums normal.   Neck: Supple, symmetrical, trachea midline, no adenopathy, thyroid: no enlargement/tenderness/nodules, no carotid bruit and no JVD. Back:   Symmetric, no curvature. ROM normal. No CVA tenderness. Lungs:   Clear to auscultation bilaterally. Chest wall:  No tenderness or deformity. Heart:  Regular rate and rhythm, S1, S2 normal, no murmur, click, rub or gallop. Abdomen:   Soft, non-tender. Bowel sounds normal. No masses,  No organomegaly.            Extremities: Extremities normal, atraumatic, no cyanosis or edema. Pulses: 2+ and symmetric all extremities. Skin: Skin color, texture, turgor normal. No rashes or lesions. Lymph nodes: Cervical, supraclavicular, and axillary nodes normal.   Neurologic: CNII-XII intact. Normal strength, sensation and reflexes throughout. Impression:      Principal Problem:    Major depressive disorder, single episode, severe without psychotic features ([facility] [address] (11/9/2022)    Active Problems:    Generalized anxiety disorder with panic attacks (11/10/2022)          Plan:     Recommendations for Treatment/Conditions:  Psychiatric treatment recommended while in hospital  Admit to behavioral health for MDD and NATALIE. Referral To:    Inpatient psychiatric care      [place], [place]   11/10/2022 6:19 PM

## 2022-11-10 NOTE — GROUP NOTE
MAXINE  GROUP DOCUMENTATION INDIVIDUAL                                                                          Group Therapy Note    Date: 11/9/2022    Group Start Time: 1945  Group End Time: 2015  Group Topic: Medication    [facility] ADULT CHEM TU Bojorquez RN    IP [address] [facility] GROUP DOCUMENTATION GROUP    Group Therapy Note    Attendees: 5       Attendance: Attended    Patient's Goal:  Understanding the importance of medication compliance. Interventions/techniques: Informed    Follows Directions: Followed directions    Interactions: Interacted appropriately    Mental Status: Calm    Behavior/appearance: Cooperative    Goals Achieved:  Identified feelings        Sonal Maria RN

## 2022-11-10 NOTE — BH NOTES
Presented from ED with ED personnel, security at   18:40. Oriented to the unit. Signed consent for treatment and other admission paperwork. Denies current SI/HI/AVH or any harmful intent. Reports this is her first admission to a mental health facility and that she has no outpatient providers. Self diagnisis of anxiety disorder and panic attacks for which she has never had treatment or medications prescribed for this diagnosis. Indicates at some time someone gave her a diagnosis but she is not sure what this was and she is not on any medications not even sure where this happened. UDS + amphetamines but she denies knowledge of this or what could make her positive for this. Denies any drug use. \"Maybe it is the sleep aid I take at night but it is over the counter\". Belongings have yet to be inventoried. Report given to the 7P night shift. Reported her mother is a major stressor in her life. She lives with mother and brother. Called mother crying and apologizing for her behaviors and said he wanted to come home that she \"does not feel comfortable\" here and is hoping to be able to leave tomorrow. Monitor safety with Q 15 min checks  Continue to morton information from patient to complete assessment and plan of care. She engaged into a relationship with someone over this month and she got scammed out of $800 which she sent to this woman and it her mothers money which she sent this woman via [address]    Mother told her se could come home tomorrow and she would come pick her up if she is allowed to leave.

## 2022-11-10 NOTE — BH NOTES
EDILSON Note: The above pt has been alert and cooperative this shift. She has been isolative during this shift. She has not been a management problem this shift. She has been compliant with medications this shift. She did attend groups this shift. She has not experienced any falls this shift. She has a flat affect with a depressed mood during this shift.

## 2022-11-10 NOTE — MED STUDENT NOTES
Subjective    Sherice Rodrigues is a 29 y.o. female  with PMH of ADHD to the ED with her aunt due to suicidal ideations which are not currently present. Patient inconsistently claims this is the first time but mentions she has had suicidal ideation before. States Plan was with pills but no intention to follow through. At the time patient states she was overwhelmed with bills and her aunt as mad at her for taking money and losing it to a stranger on the internet. She never had a job but states she helps at home with her mothers diabetes and state she receives money from her ex as they are still good friends. She recently attempted to contact as psychiatrist whose number she was given by her friend for herself but never got in contact. Patient grew up in [place] and was born in [place]. Completed school till the 10th grade but stopped due to her ADHD condition. She was taking ritalin but does not know when she stopped. No justina    PMH  ADHD, unknown when diagnosed, use to take ritalin        PSH  Tonsillectomy  Cyst removal of neck  [place] teeth removed    No complications with any surgerys    Medications  Ritalin - ADHD, unknown when she started and stopped        ROS        Objective  Visit Vitals  /76 (BP 1 Location: Left upper arm, BP Patient Position: Sitting)   Pulse 100   Temp 97.2 °F (36.2 °C)   Resp 16   Ht 5' 1\" (1.549 m)   Wt 70.3 kg (155 lb)   SpO2 97%   Breastfeeding No   BMI 29.29 kg/m²       No results found for this or any previous visit (from the past 12 hour(s)). Physical Exam            Assessment        Problem List    ICD-10-CM ICD-9-CM    1. Depression, unspecified depression type  F32. A 311                 Plan    I have discussed the plan with the patient, the patient understand and agrees. *ATTENTION:  This note has been created by a medical student for educational purposes only.   Please do not refer to the content of this note for clinical decision-making, billing, or other purposes. Please see attending physicians note to obtain clinical information on this patient. *

## 2022-11-10 NOTE — BSMART NOTE
ART THERAPY GROUP PROGRESS NOTE    Group time:9:45    The patient did not awaken/get up when called for group.

## 2022-11-10 NOTE — BSMART NOTE
SOCIAL WORK GROUP THERAPY PROGRESS NOTE    Group Time:  10:15am    Group Topic:  Coping Skills    Group Participation:     Pt expressed  interested & paid attention during session despite dysphoric mood. No self disclosure.

## 2022-11-11 PROCEDURE — 74011250637 HC RX REV CODE- 250/637: Performed by: PSYCHIATRY & NEUROLOGY

## 2022-11-11 PROCEDURE — 65220000003 HC RM SEMIPRIVATE PSYCH

## 2022-11-11 PROCEDURE — 99232 SBSQ HOSP IP/OBS MODERATE 35: CPT | Performed by: PSYCHIATRY & NEUROLOGY

## 2022-11-11 RX ADMIN — PAROXETINE HYDROCHLORIDE 20 MG: 20 TABLET, FILM COATED ORAL at 10:26

## 2022-11-11 RX ADMIN — HYDROXYZINE PAMOATE 25 MG: 25 CAPSULE ORAL at 20:42

## 2022-11-11 RX ADMIN — TRAZODONE HYDROCHLORIDE 50 MG: 50 TABLET ORAL at 20:42

## 2022-11-11 NOTE — PROGRESS NOTES
[address] [facility] [address]  Inpatient Progress Note     Date of Service: 11/11/22  Hospital Day: 2     Subjective/Interval History   11/11/22    Treatment Team Notes:  Notes reviewed and/or discussed and report that Gama Parada is a 82-year-old female admitted for suicidal ideation. No acute issues overnight. The patient slept almost 7 hours and she has been pleasant and cooperative on the unit. Patient interview: Gama Parada was interviewed by this writer today. The patient denied complaints and reported euthymic mood. We obtained some more history surrounding the circumstances leading to her hospitalization. The patient said that her aunt was upset with her because she had fallen victim to a scam online and had spent $800 of her mother's money. She says that was the reason the aunt called the police to bring her to the hospital.  She says she was not endorsing suicidal ideations at that time but had endorsed suicidal ideations with plan to overdose on medications last Sunday. The suicidal ideations were triggered by the same thing, discovering that she had been scammed by a female online. The patient states she feels better now and is remorseful of the scam.  She has apologized to her mother she is no longer having any suicidal thoughts. She was also upset at the end of October because of an argument she had with her ex-girlfriend over her ex-girlfriend's 5year-old nephew that they have both taken care of since he was a toddler. She describes this child as her son and says even though she had broken up with her ex, she was still taking care of him and providing babysitting. Apparently the child got bit by a dog while in her custody, and the ex-girlfriend decided not to let her see the child anymore. She says she misses the child and it has caused some stress. The patient reports she started cutting herself sometime this year  to deal with emotional pain.   She feels that the cutting took her pain away. She was feeling like nobody cared about her when she started cutting. We discussed other ways that she can deal with stress. Patient says she enjoys watching television, talking with her friends and playing video games. She is tolerating Paxil without adverse reaction or noticeable side effects. Objective     Visit Vitals  /76 (BP 1 Location: Left upper arm, BP Patient Position: Sitting)   Pulse 100   Temp 97.2 °F (36.2 °C)   Resp 16   Ht 5' 1\" (1.549 m)   Wt 70.3 kg (155 lb)   SpO2 97%   Breastfeeding No   BMI 29.29 kg/m²       No results found for this or any previous visit (from the past 24 hour(s)). Mental Status Examination     Appearance/Hygiene 29 y.o. WHITE/NON- female  Hygiene: Fair   Behavior/Social Relatedness Appropriate   Musculoskeletal Gait/Station: appropriate  Tone (flaccid, cogwheeling, spastic): not assessed  Psychomotor (hyperkinetic, hypokinetic): calm   Involuntary movements (tics, dyskinesias, akathisa, stereotypies): none   Speech   Rate, rhythm, volume, fluency and articulation are appropriate   Mood   euthymic   Affect    within normal limits   Thought Process Linear and goal directed   Thought Content and Perceptual Disturbances Denies self-injurious behavior (SIB), suicidal ideation (SI), aggressive behavior or homicidal ideation (HI)    Denies auditory and visual hallucinations   Sensorium and Cognition  Grossly intact   Insight  fair   Judgment fair        Assessment/Plan      Psychiatric Diagnoses:   Patient Active Problem List   Diagnosis Code    Major depressive disorder, single episode, severe without psychotic features ([facility] [address] F32.2    Generalized anxiety disorder with panic attacks F41.1, F41.0       Level of impairment/disability: Len Flores is a 29 y.o. who is currently being treated for major depressive disorder. Patient is doing better today and denies suicidal ideation. Her mood is euthymic.   She was more forthcoming with information today. 1.  Continue Paxil 20 mg daily for depression and anxiety. 2.  Reviewed instructions, risks, benefits and side effects of medications  3. Disposition/Discharge Date: self-care/home, possible discharge on Monday with outpatient follow-up.     Deanna Selby MD [facility]  Psychiatry

## 2022-11-11 NOTE — BSMART NOTE
BH Biopsychosocial Assessment    Current Level of Psychosocial Functioning     []Independent  [x]Dependent  []Minimal Assist      Comments:      Psychosocial High Risk Factors (check all that apply)      []Unable to obtain meds                                                               []Chronic illness/pain    []Substance abuse   []Lack of Family Support   [x]Financial stress   [x]Isolation   [x]Inadequate Community Resources  [x]Suicide attempt(s)  [x]Not taking medications   []Victim of crime   [x]Developmental Delay  [x]Unable to manage personal needs    []Age 72 or older   []  Homeless  []Sharyn transportation   []Readmission within 30 days  []Unemployment  []Traumatic Event    Psychiatric Advanced Directive:None     Family to involve in treatment: Pt.'s mother is involve in her treatment     Sexual Orientation:  Lesbian    Patient Strengths: Pt. Is willing to seek help     Patient Barriers: Pt. Is limited, has limited insight and delays    Opiate education provided:Pt. Denies     Safety plan:SW discussed safety plan. Pt . Denies safety plan      CMHC/MH history: Please refer to the psychiatrist and NP or PA note    Major Depressive Disorder severe without psychotic features  Generalized Anxiety Disorder with panic attacks   Developmental Disability      Plan of Care: JAMAAL discussed and encouraged pt. to participate in the following activities: medication management, group/individual therapies, family meetings, psycho education, treatment team meetings to assist with stabilization     Initial Discharge Plan: 3-5 days     Clinical Summary: Pt. Is a 29year old female with history of Major Depressive Disorder severe without psychotic features  Generalized Anxiety Disorder with panic attacks Developmental Disability. Pt. was admitted to this facility or ideations to harm self   by self mutilation to her wrist and abdomen .  Pt has superficial cuts to those areas. SW met with pt to discuss admission. Pt. Admits to spending  her mother money and feels like her family is against ., pt stated she feels better today and looks forward to return to her home. Pt stated she has been depressed for the lat 2 years after breaking up with her girlfriend. Pt stated she had been in a relationship with the person since she was 25years old. Pt stated after the break up she moved back in with her mother. Pt reports to feeling overwhelmed at times , helping to care for her mother. Pt stated she does not work and has social anxiety being around a lot of people. Pt  stated she does not have income and would like to help he mother. Pt. Is currently denying ideations and hallucinations. SW discussed safety plan, community supportive services and coping skills. Pt. Mood is improving ,the patient continues to have poor insight. SW will continue to provide pt with support towards dc planning. SW Collateral:  SW discussed dc plan with pt.'s family. The pt's family stated pt can return to home upon dc.        Luis Fernando Bush MA, LMHP-R

## 2022-11-11 NOTE — BSMART NOTE
INDIVIDUAL ART THERAPY NOTE    TIME 12:35    PARTICIPATION LEVEL: Participates fully in the art process. ATTENTION LEVEL : Able to focus on task. FOCUS: Identifying goals    SYMBOLIC & THEMATIC CONTENT AS NOTED IN IMAGERY: Patient was calm and displayed a bright affect. She was attentive through the session. She was focused while making art. When she finished drawing, patient described the artwork in detail. Patient discussed support group and what she was looking forward to when she could go home. Patient showed insight into what she created.

## 2022-11-11 NOTE — PROGRESS NOTES
Problem: Suicide  Goal: *STG: Remains safe in hospital  Description: Pt to remains safe in hospital during this admission. Outcome: Progressing Towards Goal  Goal: *STG: Attends activities and groups  Description: Pt to attend at least 3 out of 5 groups daily during this admission. Outcome: Progressing Towards Goal  Goal: *STG/LTG: Complies with medication therapy  Description: Pt will be medication compliant daily during this admission. Outcome: Progressing Towards Goal     Problem: Falls - Risk of  Goal: *Absence of Falls  Description: Document Shiloh Lalo Fall Risk and appropriate interventions in the flowsheet. Pt will have no fall during this admission. Outcome: Progressing Towards Goal  Note: Fall Risk Interventions: The patient is alert and oriented to time, place and situation. She had been pleasant and cooperative this PM.  She denies thoughts of harm to herself and others. She has been medication compliant. She has been interacting well with peers. There has been no falls/incidents this PM.  Continuing to monitor and will continue to provide support.

## 2022-11-11 NOTE — BSMART NOTE
ART THERAPY GROUP PROGRESS NOTE    PATIENT SCHEDULED FOR GROUP AT: 9:45    ATTENDANCE: 1/2    PARTICIPATION LEVEL: Participates fully in the art process. ATTENTION LEVEL : Able to focus on task. FOCUS: Affirmations     SYMBOLIC & THEMATIC CONTENT AS NOTED IN IMAGERY: Patient was calm and pleasant. She was attentive and focused during group. She had to leave group early to meet with her doctor. Patient arrived back when group was ending and she shared after with the writer about what she created. Patient shared affirmation and insight into what it meant to her.

## 2022-11-11 NOTE — GROUP NOTE
[facility] GROUP DOCUMENTATION INDIVIDUAL    Spirituality-\"Sharing Amauri By Putting Others First\"                                                                        Group Therapy Note    Date: 11/10/2022    Group Start Time: 0800  Group End Time: 0815  Group Topic: Nursing    [facility] ADULT CHEM DEP    Sue Piper    IP [address] [facility] GROUP DOCUMENTATION GROUP    Group Therapy Note    Attendees: 3       Attendance: Did not attend      Additional Notes:  Pt sleeping    [facility] People

## 2022-11-11 NOTE — PROGRESS NOTES
Problem: Suicide  Goal: *STG: Remains safe in hospital  Description: Pt to remains safe in hospital during this admission. Outcome: Progressing Towards Goal  Goal: *STG/LTG: Complies with medication therapy  Description: Pt will be medication compliant daily during this admission. Outcome: Progressing Towards Goal     Problem: Falls - Risk of  Goal: *Absence of Falls  Description: Document aMrquita Azevedo Fall Risk and appropriate interventions in the flowsheet. Pt will have no fall during this admission. Outcome: Progressing Towards Goal  Note: Fall Risk Interventions:             Assumed care of pt at 0730. Pt was in her room in bed most of shift, thought did come to day room for meals and briefly watch tv. Pt interacts with staff and peers appropriately. Pt denies SI/HI/SH thoughts at this time and has contracted for safety. Pt denies AV hallucinations. Pt rates her depression 6/10, anxiety 3/10. Pt is compliant with prescribed medications, no prn medications requested. No concerns at this time, will monitor appropriately.

## 2022-11-11 NOTE — BH NOTES
Pt appeared to have slept for 6.75 hours thus far. Will continue to monitor for safety and changes in behavior.

## 2022-11-12 PROCEDURE — 65220000003 HC RM SEMIPRIVATE PSYCH

## 2022-11-12 PROCEDURE — 99231 SBSQ HOSP IP/OBS SF/LOW 25: CPT | Performed by: PSYCHIATRY & NEUROLOGY

## 2022-11-12 PROCEDURE — 74011250637 HC RX REV CODE- 250/637: Performed by: PSYCHIATRY & NEUROLOGY

## 2022-11-12 RX ADMIN — TRAZODONE HYDROCHLORIDE 50 MG: 50 TABLET ORAL at 20:02

## 2022-11-12 RX ADMIN — PAROXETINE HYDROCHLORIDE 20 MG: 20 TABLET, FILM COATED ORAL at 09:58

## 2022-11-12 RX ADMIN — HYDROXYZINE PAMOATE 25 MG: 25 CAPSULE ORAL at 20:02

## 2022-11-12 NOTE — PROGRESS NOTES
[address] [facility] [address]  Inpatient Progress Note     Date of Service: 11/12/22  Hospital Day: 3     Subjective/Interval History   11/12/22    Treatment Team Notes:  Notes reviewed and/or discussed and report that Roderic Gottron is a patient recently admitted to the facility attention better to the dictated admission note, the same as the daily progress notes which are self-explanatory. Patient interview: Roderic Gottron was interviewed by this writer today. The patient was admitted with a history of depression which appears to be reactive, however severe. Per her attending psychiatrist notes, the patient has begun to show some evidence of improvement. Sleeping patterns, the same as eating patterns have improved the same as the patient's overall sense of helplessness and hopelessness. No evidence of medications related side effects were described to the patient during the psych rounds visit this morning. Objective     Visit Vitals  /79   Pulse 98   Temp 97 °F (36.1 °C)   Resp 16   Ht 5' 1\" (1.549 m)   Wt 70.3 kg (155 lb)   SpO2 97%   Breastfeeding No   BMI 29.29 kg/m²     Vitals are stable    No results found for this or any previous visit (from the past 24 hour(s)). Mental Status Examination     Appearance/Hygiene 29 y.o.  WHITE/NON- female  Hygiene: Fair   Behavior/Social Relatedness Appropriate   Musculoskeletal Gait/Station: appropriate  Tone (flaccid, cogwheeling, spastic): not assessed  Psychomotor (hyperkinetic, hypokinetic): calm   Involuntary movements (tics, dyskinesias, akathisa, stereotypies): none   Speech   Rate, rhythm, volume, fluency and articulation are appropriate   Mood   Depression is improving   Affect    Congruent   Thought Process Linear and goal directed   Thought Content and Perceptual Disturbances Denies self-injurious behavior (SIB), suicidal ideation (SI), aggressive behavior or homicidal ideation (HI)    Denies auditory and visual hallucinations, ideas of reference or influence of any delusions   Sensorium and Cognition  Grossly intact   Insight  Improving slowly   Judgment Improving slowly        Assessment/Plan      Psychiatric Diagnoses:   Patient Active Problem List   Diagnosis Code    Major depressive disorder, single episode, severe without psychotic features [facility] F32.2    Generalized anxiety disorder with panic attacks F41.1, F41.0       Medical Diagnoses: Per attending physician    Psychosocial and contextual factors: See admission note and progress notes    Level of impairment/disability: TBD. 1.  The patient was a started treatment with paroxetine 20 mg daily. Treatment was initiated on or about November 10, with the patient denying side effects in association to treatment as prescribed. 2.  Reviewed instructions, risks, benefits and side effects of medications  3.   Disposition/Discharge Date: self-care/home, TBD    Na Potter MD, [address] [place] [address]

## 2022-11-12 NOTE — BH NOTES
Patient participated in different conversations with other patients, while watching  television programs. There were lots of laughs  and smiles that came from patient as she interacted  with other individuals. Patient did not have any behaviors issues. Patient slept approximately  7-hours during the 11 -pm. To 7 -am. night shift. Patient is oversee for safety and support.

## 2022-11-13 PROCEDURE — 74011250637 HC RX REV CODE- 250/637: Performed by: PSYCHIATRY & NEUROLOGY

## 2022-11-13 PROCEDURE — 65220000003 HC RM SEMIPRIVATE PSYCH

## 2022-11-13 PROCEDURE — 99231 SBSQ HOSP IP/OBS SF/LOW 25: CPT | Performed by: PSYCHIATRY & NEUROLOGY

## 2022-11-13 RX ADMIN — PAROXETINE HYDROCHLORIDE 20 MG: 20 TABLET, FILM COATED ORAL at 08:22

## 2022-11-13 RX ADMIN — HYDROXYZINE PAMOATE 25 MG: 25 CAPSULE ORAL at 20:04

## 2022-11-13 RX ADMIN — TRAZODONE HYDROCHLORIDE 50 MG: 50 TABLET ORAL at 20:04

## 2022-11-13 NOTE — PROGRESS NOTES
Sitting in day area when this provider took over care. Compliant with medications  Voiced no complaints or concerns this shift  Is hopeful for discharge tomorrow. Indicates having spoken with mother about the $80 that she took from mother and sent via cash philippe to a female she struck up an online relationship with. \"Well she said she was sorry and she would pay me back, I know she has money so told mother that she would pay this back\"    Wants to set up outpatient services so she has someone to follow up with her and hopes for a  that can help her make change in life. Denies SI/HI/AVH or any harmful intent  Continue to monitor safety  Continue treatment plan    Problem: Suicide  Goal: *STG: Remains safe in hospital  Description: Pt to remains safe in hospital during this admission. Outcome: Progressing Towards Goal     Problem: Suicide  Goal: *STG: Attends activities and groups  Description: Pt to attend at least 3 out of 5 groups daily during this admission. Outcome: Progressing Towards Goal     Problem: Falls - Risk of  Goal: *Absence of Falls  Description: Document Alexis Counts Fall Risk and appropriate interventions in the flowsheet. Pt will have no fall during this admission.   Note: Fall Risk Interventions:

## 2022-11-13 NOTE — BH NOTES
Pt has been observed in day area conversing appropriately with peers. Remains compliant with meds and prn meds given per pt request.  Pt stated, \"the anxiety medicine works good with the sleeping pill. I didn't know I had sleeping issues. \"  Pt was provided with education on meds. All needs assessed and met. Will continue to monitor and support as needed.

## 2022-11-13 NOTE — PROGRESS NOTES
[address]  Inpatient Progress Note     Date of Service: 11/13/22  Hospital Day: 4     Subjective/Interval History   11/13/22    Treatment Team Notes:  Notes reviewed and/or discussed and report that Aron Callas is the patient was seen during psych rounds today. Her attending physician, Dr. Constantin Cannon will see her tomorrow. Patient interview: Aron Callas was interviewed by this writer today. The patient continues to describe her depression improving. She is also denying any medications related side effects. She is looking forward to discharge sometime next week. That would be the decision of care attending physician. Objective     Visit Vitals  /74   Pulse 88   Temp 97.5 °F (36.4 °C)   Resp 18   Ht 5' 1\" (1.549 m)   Wt 70.3 kg (155 lb)   SpO2 99%   Breastfeeding No   BMI 29.29 kg/m²     Vitals are stable    No results found for this or any previous visit (from the past 24 hour(s)). Mental Status Examination     Appearance/Hygiene 29 y.o.  WHITE/NON- female  Hygiene: Fair   Behavior/Social Relatedness Appropriate   Musculoskeletal Gait/Station: appropriate  Tone (flaccid, cogwheeling, spastic): not assessed  Psychomotor (hyperkinetic, hypokinetic): calm   Involuntary movements (tics, dyskinesias, akathisa, stereotypies): none   Speech   Rate, rhythm, volume, fluency and articulation are appropriate   Mood   Depression has improved   Affect    Congruent   Thought Process Linear and goal directed   Thought Content and Perceptual Disturbances Denies self-injurious behavior (SIB), suicidal ideation (SI), aggressive behavior or homicidal ideation (HI)    Denies auditory and visual hallucinations   Sensorium and Cognition  Grossly intact   Insight  Fair   Judgment Fair        Assessment/Plan      Psychiatric Diagnoses:   Patient Active Problem List   Diagnosis Code    Major depressive disorder, single episode, severe without psychotic features ([facility] [address] F32.2 Generalized anxiety disorder with panic attacks F41.1, F41.0       Medical Diagnoses: Per attending physician    Psychosocial and contextual factors: See admission note and progress notes    Level of impairment/disability: TBD. 1.  The same treatment will continue. Very possibly patient will be able to be discharged early this week. 2.  Reviewed instructions, risks, benefits and side effects of medications  3.   Disposition/Discharge Date: self-care/home, with outpatient psychiatric follow-up to be a strong of the suggested    Rashad Gonzalez MD, [address] [place] [address]

## 2022-11-13 NOTE — GROUP NOTE
IP  GROUP DOCUMENTATION INDIVIDUAL                                                                          Group Therapy Note    Date: 11/13/2022    Group Start Time: 1030  Group End Time: 5649  Group Topic: Spirituality Group    [address]    [facility] Service     [facility]     Today we discussed that it was World Kindness Day and the impact others' kindness has had on us and the impact we can have upon others through kindness. I shared Lewis Yang 4:32 from the [address] [place] [address] and group members read and discussed quotes from various Latter-day leaders and writers surrounding kindness. We ended group by listening to We are the World as an example of what kindness can look like when people come together with the common goals of cariing for others. Attendance: Attended    Interventions/techniques: Informed, Validated, Promoted peer support, Provide feedback, and Supported    Follows Directions: Followed directions    Interactions: Interacted appropriately    Mental Status: Calm and Happy    Behavior/appearance: Attentive    Goals Achieved: Able to engage in interactions, Able to listen to others, and Able to give feedback to another. She also provided positive feedback to her peer. Additional Notes:  Adriane Redmond engaged concerning her Latter-day beliefs (She believes in God), and in our discussion surrounding the impact and importance of kindness.     Cyndee Treviño

## 2022-11-14 VITALS
SYSTOLIC BLOOD PRESSURE: 116 MMHG | WEIGHT: 155 LBS | DIASTOLIC BLOOD PRESSURE: 85 MMHG | HEIGHT: 61 IN | HEART RATE: 93 BPM | TEMPERATURE: 97.3 F | RESPIRATION RATE: 16 BRPM | OXYGEN SATURATION: 96 % | BODY MASS INDEX: 29.27 KG/M2

## 2022-11-14 PROCEDURE — 74011250637 HC RX REV CODE- 250/637: Performed by: PSYCHIATRY & NEUROLOGY

## 2022-11-14 PROCEDURE — 99239 HOSP IP/OBS DSCHRG MGMT >30: CPT | Performed by: PSYCHIATRY & NEUROLOGY

## 2022-11-14 RX ORDER — PAROXETINE HYDROCHLORIDE 20 MG/1
20 TABLET, FILM COATED ORAL DAILY
Qty: 30 TABLET | Refills: 0 | Status: SHIPPED | OUTPATIENT
Start: 2022-11-15

## 2022-11-14 RX ORDER — TRAZODONE HYDROCHLORIDE 50 MG/1
50 TABLET ORAL
Qty: 30 TABLET | Refills: 0 | Status: SHIPPED | OUTPATIENT
Start: 2022-11-14

## 2022-11-14 RX ORDER — HYDROXYZINE PAMOATE 25 MG/1
25 CAPSULE ORAL
Qty: 45 CAPSULE | Refills: 0 | Status: SHIPPED | OUTPATIENT
Start: 2022-11-14 | End: 2022-11-28

## 2022-11-14 RX ADMIN — PAROXETINE HYDROCHLORIDE 20 MG: 20 TABLET, FILM COATED ORAL at 08:10

## 2022-11-14 NOTE — DISCHARGE SUMMARY
[facility]  Inpatient Psychiatry   Discharge Summary     Admit date: 11/8/2022    Discharge date and time: 11/14/2022 11:26 AM    Discharge Physician: Mustapha Colindres MD    DISCHARGE DIAGNOSES     Psychiatric Diagnoses:   Patient Active Problem List   Diagnosis Code    Major depressive disorder, single episode, severe without psychotic features ([facility] [address] F32.2    Generalized anxiety disorder with panic attacks F41.1, F41.0       Level of impairment/disability: Taqueira Daniel2 is a 29 y.o. WHITE/NON- female with a history of ADHD who was admitted voluntarily from our ED for suicidal ideation with a plan to cut herself. The patient has a history of cutting behavior and has been inflicting superficial lacerations on her upper extremities and abdomen. The patient is a limited historian and is very guarded with information. She made several conflicting statements during the interview. She is requesting to be discharged. Attempts were made to get collateral from her mother but her mother was also a poor historian. Her aunt provided some collateral information. According to ED notes, the patient presented to the ED endorsing suicidal ideation because she felt like everybody was against her. She said her mother was a major stressor in her life and that she was having anxiety and panic attacks. She reported a plan to overdose on medications. She also said that she had a cutter and had cut her left arm and abdomen. Collateral information from aunt indicates that the patient has been cutting herself for quite a while. The family first noted scars of cutting in the summertime. They feel that she has had an increase in cutting behavior. Aunt reports that she is more depressed and isolative. She is also more irritable and has not been sleeping well.   Aunt said that the patient has panic attacks at night and she does not know if the panic attacks are inducing the cutting. Aunt reported that the patient is usually very guarded and quiet. She has not been working due to anxiety as she says she cannot be around people. In fact she has never had any job. The patient's mother reported that the patient is depressed and withdrawn. She spends a lot of time in her room looking out the window and needs to be medicated for depression. The patient herself denied feeling depressed. She says she has been feeling \"more happy and hyper\" the past few months. She feels stressed out at times due to having to help her mother with her ADLs and states that being her mother's caregiver gets overwhelming at times. She is also worried about finances and bills. The patient was vague about when she started cutting but she says she last cut herself 2 weeks ago. She denied any other stressors. She reported poor sleep for the past 2 years since she broke up with her boyfriend. She says she states she gets only about 4 to 5 hours of sleep at night. She denied problems with appetite or energy level. She reported problems with concentration but states she has been diagnosed with ADHD in childhood. She endorsed irritability. The patient was screened for luis fernando but it was very difficult to get a history from her. She endorsed symptoms of sometimes being very talkative but said it only lasted a few hours. She said her brother had informed her that she was hyper and that she can get hyper at times. Her aunt was also asked about manic symptoms and did not endorse any manic symptoms. The aunt mentioned that the patient has been having panic attacks for the past 2 years. The patient herself did not want to disclose much about anxiety or panic attacks. The patient denies use of recreational drugs or alcohol although her UDS was positive for amphetamines. She says she has been prescribed Ritalin in childhood but had not taken it in a number of years.      This is the patient's first psychiatric hospitalization and she denies history of prior suicide attempts. However, she informed the crisis worker that she had tried to overdose on medications in 2012 or 2013. She denies outpatient treatment. She says she recently contacted an outpatient clinic to get an appointment in order to establish care but she has not been given the appointment yet and has not established care with anybody. Hospital course: The patient was admitted and monitored for suicidal ideation and depression. She received individual, group and medication therapy. Once on the unit, she denied suicidal ideations. She did not appear to have any depressed mood or affect. Her affect was bright for the almost all the hospitalization. She also denied feeling sad. Nevertheless, she was prescribed Paxil 20 mg daily which she tolerated well. In the course of the hospitalization, she was less guarded and did provide some history which explains the context of her hospitalization. It appears she had been through some recent stressors which had triggered suicidal ideations prior to hospitalization and depressed mood. The patient was not a behavior problem on the unit. She was motivated for outpatient treatment and was referred to [facility] for medication management and therapy. 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 The patient is deemed to be at a low acute risk of suicide. We discussed a crisis plan. She reports having a good support system. Her mood is euthymic and she denies suicidal ideation. She denies access to guns and alcohol or recreational drug use. DISPOSITION/FOLLOW-UP     Disposition: Home    Follow-up Appointments:    Follow-up Information       Follow up With Specialties Details Why Contact Info    None    None ([phone]) Patient stated that they have no PCP MEDICATION CHANGES   Outpatient medications:  No current facility-administered medications on file prior to encounter. No current outpatient medications on file prior to encounter. Discharged medication:  Discharge Medication List as of 11/14/2022 10:43 AM        START taking these medications    Details   hydrOXYzine pamoate (VISTARIL) 25 mg capsule Take 1 Capsule by mouth three (3) times daily as needed for Sleep or Anxiety for up to 14 days. Indications: anxious, Print, Disp-45 Capsule, R-0      PARoxetine (PAXIL) 20 mg tablet Take 1 Tablet by mouth daily. Indications: major depressive disorder, Print, Disp-30 Tablet, R-0      traZODone (DESYREL) 50 mg tablet Take 1 Tablet by mouth nightly as needed for Sleep. Indications: insomnia associated with depression, Print, Disp-30 Tablet, R-0           STOP taking these medications       naproxen (NAPROSYN) 500 mg tablet Comments:   Reason for Stopping:         ondansetron hcl (Zofran) 4 mg tablet Comments:   Reason for Stopping:               Instructions, risks (black box warning), benefits and side effects (EPS, TD, NMS) were discussed in detail prior to discharge. Patient denied any adverse medication side effects prior to discharge.        LABS/IMAGING DURING ADMISSION     Results for orders placed or performed during the hospital encounter of 11/08/22   COVID-19 WITH INFLUENZA A/B   Result Value Ref Range    SARS-CoV-2 by PCR Not detected NOTD      Influenza A by PCR Not detected NOTD      Influenza B by PCR Not detected NOTD     CBC WITH AUTOMATED DIFF   Result Value Ref Range    WBC 11.7 4.6 - 13.2 K/uL    RBC 4.76 4.20 - 5.30 M/uL    HGB 13.6 12.0 - 16.0 g/dL    HCT 40.3 35.0 - 45.0 %    MCV 84.7 78.0 - 100.0 FL    MCH 28.6 24.0 - 34.0 PG    MCHC 33.7 31.0 - 37.0 g/dL    RDW 12.3 11.6 - 14.5 %    PLATELET 087 166 - 443 K/uL    MPV 9.6 9.2 - 11.8 FL    NRBC 0.0 0  WBC    ABSOLUTE NRBC 0.00 0.00 - 0.01 K/uL    NEUTROPHILS 66 40 - 73 % LYMPHOCYTES 27 21 - 52 %    MONOCYTES 7 3 - 10 %    EOSINOPHILS 0 0 - 5 %    BASOPHILS 0 0 - 2 %    IMMATURE GRANULOCYTES 0 0.0 - 0.5 %    ABS. NEUTROPHILS 7.6 1.8 - 8.0 K/UL    ABS. LYMPHOCYTES 3.1 0.9 - 3.6 K/UL    ABS. MONOCYTES 0.8 0.05 - 1.2 K/UL    ABS. EOSINOPHILS 0.0 0.0 - 0.4 K/UL    ABS. BASOPHILS 0.0 0.0 - 0.1 K/UL    ABS. IMM. GRANS. 0.0 0.00 - 0.04 K/UL    DF AUTOMATED     METABOLIC PANEL, COMPREHENSIVE   Result Value Ref Range    Sodium 137 136 - 145 mmol/L    Potassium 3.3 (L) 3.5 - 5.5 mmol/L    Chloride 104 100 - 111 mmol/L    CO2 29 21 - 32 mmol/L    Anion gap 4 3.0 - 18 mmol/L    Glucose 100 (H) 74 - 99 mg/dL    BUN 10 7.0 - 18 MG/DL    Creatinine 0.64 0.6 - 1.3 MG/DL    BUN/Creatinine ratio 16 12 - 20      eGFR >60 >60 ml/min/1.73m2    Calcium 9.4 8.5 - 10.1 MG/DL    Bilirubin, total 0.6 0.2 - 1.0 MG/DL    ALT (SGPT) 18 13 - 56 U/L    AST (SGOT) 11 10 - 38 U/L    Alk.  phosphatase 89 45 - 117 U/L    Protein, total 8.0 6.4 - 8.2 g/dL    Albumin 4.0 3.4 - 5.0 g/dL    Globulin 4.0 2.0 - 4.0 g/dL    A-G Ratio 1.0 0.8 - 1.7     URINALYSIS W/ RFLX MICROSCOPIC   Result Value Ref Range    Color DARK YELLOW      Appearance CLOUDY      Specific gravity 1.030 1.005 - 1.030      pH (UA) 5.5 5.0 - 8.0      Protein TRACE (A) NEG mg/dL    Glucose Negative NEG mg/dL    Ketone TRACE (A) NEG mg/dL    Bilirubin Negative NEG      Blood Negative NEG      Urobilinogen 1.0 0.2 - 1.0 EU/dL    Nitrites Negative NEG      Leukocyte Esterase LARGE (A) NEG     ETHYL ALCOHOL   Result Value Ref Range    ALCOHOL(ETHYL),SERUM <3 0 - 3 MG/DL   HCG QL SERUM   Result Value Ref Range    HCG, Ql. Negative NEG     DRUG SCREEN, URINE   Result Value Ref Range    BENZODIAZEPINES Negative NEG      BARBITURATES Negative NEG      THC (TH-CANNABINOL) Negative NEG      OPIATES Negative NEG      PCP(PHENCYCLIDINE) Negative NEG      COCAINE Negative NEG      AMPHETAMINES Positive (A) NEG      METHADONE Negative NEG      HDSCOM (NOTE)    URINE MICROSCOPIC ONLY   Result Value Ref Range    WBC 3 to 5 0 - 4 /hpf    RBC NONE 0 - 5 /hpf    Epithelial cells 4+ 0 - 5 /lpf    Bacteria 1+ (A) NEG /hpf    Mucus 4+ (A) NEG /lpf    Amorphous Crystals FEW (A) NEG          DISCHARGE MENTAL STATUS EVALUATION     Appearance/Hygiene 29 y.o. WHITE/NON- female  Hygiene: Good   Attitude/Behavior/Social Relatedness Appropriate   Musculoskeletal Gait/Station: appropriate  Tone (flaccid, cogwheeling, spastic): not assessed  Psychomotor (hyperkinetic, hypokinetic): calm  Involuntary movements (tics, dyskinesias, akathisa, stereotypies): none   Speech   Rate, rhythm, volume, fluency and articulation are appropriate   Mood   euthymic   Affect    within normal limits   Thought Process Linear and goal directed   Thought Content and Perceptual Disturbances Denies self-injurious behavior (SIB), suicidal ideation (SI), aggressive behavior or homicidal ideation (HI)    Denies auditory and visual hallucinations   Sensorium and Cognition  Grossly intact   Insight  fair   Judgment fair       SUICIDE RISK ASSESSMENT     [] Admission  [x] Discharge     Key Factors:   Current admission precipitated by suicide attempt?   []  Yes     2    [x]  No     1     Suicide Attempt History  [] Past attempts of high lethality    2 [x]  Past attempts of low lethality    1 []  No previous attempts       0   Suicidal Ideation []  Constant suicidal thoughts      2 []  Intermittent or fleeting suicidal  thoughts  1 [x]  Denies current suicidal thoughts    0   Suicide Plan   []  Has plan with actual OR potential access to planned method    2 []  Has plan without access to planned method      1 [x]  No plan            0   Plan Lethality []  Highly lethal plan (Carbon monoxide, gun, hanging, jumping)    2 []  Moderate lethality of plan          1 []  Low lethality of plan (biting, head banging, superficial scratching, pillow over face)  0   Safety Plan Agreement  []  Unwilling OR unable to agree due to impaired reality testing   2   []  Patient is ambivalent and/or guarded      1 [x]  Reliably agrees        0   Current Morbid Thoughts (reunion fantasies, preoccupations with death) []  Constantly     2     []  Frequently    1 [x]  Rarely    0   Elopement Risk  []  High risk     2 []  Moderate risk    1 [x]   Low risk    0   Symptoms    []  Hopeless  []  Helpless  []  Anhedonia   []  Guilt/shame  []  Anger/rage  []  Anxiety  []  Insomnia   []  Agitation   []  Impulsivity  []  5-6 symptoms present    2 []  3-4 symptoms present    1  [x]  0-2 symptoms present    0     Scoring Key:  10 or higher = Imminent Risk (consider 1:1)  4 - 9 = Moderate Risk (consider q 15 minute observation)Attended alcohol, tobacco, prescription and other drug psychoeducation group.   0 - 3 = Low Risk (consider q 30 minute observation)    Total Score: 2  ------------------------------------------------------------------------------------------------------------------  PLEASE ADDRESS THE FOLLOWING 5 ISSUES     Physician's Subjective Appraisal of Risk (check one):  []  Patient replies not trustworthy: several non-verbal cues. []  Patient replies questionable: trustworthy: at least 1 non-verbal cue. [x]  Patient replies appear trustworthy. Family History of Suicide?    []  Yes  [x]  No    Protective measures (select all that apply):  [x]  Successful past responses to stress  []  Spiritual/Episcopalian beliefs  [x]  Capacity for reality testing  []  Positive therapeutic relationships  [x]  Social supports/connections  []  Positive coping skills  []  Frustration tolerance/optimism  []  Children or pets in the home  []  Sense of responsibility to family  [x]  Agrees to treatment plan and follow up    Others (list):    High Risk Diagnoses (select all that apply):  [x]  Depression/Bipolar Disorder  []  Dual Diagnosis  []  Cardiovascular Disease  []  Schizophrenia  []  Chronic Pain  []  Epilepsy  []  Cancer  []  Personality Disorder  []  HIV/AIDS  [] Multiple Sclerosis    Dangerousness Assessment (Suicide, homicide, property destruction. ..)    Risk Factors reviewed and risk assessed to be:  [] low  [x] low-moderate  [] moderate   [] moderate-high  [] high     Protection factors reviewed and risk assessed to be:  [x] low  [] low-moderate  [] moderate   [] moderate-high  [] high     Response to treatment and risk assessed to be:  [x] low  [] low-moderate  [] moderate   [] moderate-high  [] high     Support reviewed and risk assessed to be:  [x] low  [] low-moderate  [] moderate   [] moderate-high  [] high     Acceptance of Discharge and outpatient treatment reviewed and risk assessed to be:    [x] low  [] low-moderate  [] moderate   [] moderate-high  [] high   Overall risk assessed to be:  [x] low  [] low-moderate  [] moderate   [] moderate-high  [] high     Completion of discharge was greater than 30 minutes. Over 50% of today's discharge was geared towards counseling and coordination of care.           Elana Bryant MD  Psychiatry  [facility]

## 2022-11-14 NOTE — BSMART NOTE
Pt. Is a 29year old female with history of Major Depressive Disorder severe without psychotic features Generalized Anxiety Disorder with panic attacks Developmental Disability. Pt. was admitted to this facility or ideations to harm self   by self mutilation to her wrist and abdomen . Pt has superficial cuts to those areas. Pt.'s case was discussed this am un staffing. pt will be dc to her home address today. Pt. Will be encouraged to follow-up with [facility] [place] for aftercare. Pt. Was referred to [facility] services for crisis stabilization. SW met with pt to discuss dc . Pt. Denies ideations and hallucinations. Pt. Was encouraged to take medications and follow up with outpt services. Pt. Will have a family friend to pick her up at [place].       Evan Levin MA,LMHP-R

## 2022-11-14 NOTE — BH NOTES
Pt has been quiet in day area. Converses appropriately with peers and staff. Remains compliant with meds and continues to endorse how well prns and scheduled med has been working for her. She reported that she goes to court on Monday and is hopeful for discharge directly after. Pt feels ready to go home and reported that she will keep up her future appointments. Will continue to monitor and support as needed.

## 2022-11-14 NOTE — BH NOTES
Pt in possession of all of her belongings. Pt denies SI/HI/SH thoughts at this time. Pt denies AV hallucinations.   Pt escorted of the unit by [facility] to front of hospital.

## 2022-11-14 NOTE — BSMART NOTE
ART THERAPY GROUP PROGRESS NOTE    PATIENT SCHEDULED FOR GROUP AT: 9:45    ATTENDANCE: 3/4    PARTICIPATION LEVEL: Participates fully in the art process. ATTENTION LEVEL : Able to focus on task. FOCUS: Mindfulness    SYMBOLIC & THEMATIC CONTENT AS NOTED IN IMAGERY: Patient was calm and pleasant. She was attentive and focused while working. Patient was invested on her artwork. Patient was also focused on discharge, needing to complete paperwork so she left and came back. Patient participated in discussion, giving insight into the words she picked.  Patient discussed excitement about discharging from the hospital.

## 2023-05-22 ENCOUNTER — HOSPITAL ENCOUNTER (OUTPATIENT)
Facility: HOSPITAL | Age: 33
Discharge: HOME | End: 2023-05-25
Payer: COMMERCIAL

## 2023-05-22 ENCOUNTER — TRANSCRIBE ORDERS (OUTPATIENT)
Facility: HOSPITAL | Age: 33
End: 2023-05-22

## 2023-05-22 DIAGNOSIS — R42 VERTIGO: ICD-10-CM

## 2023-05-22 DIAGNOSIS — R42 VERTIGO: Primary | ICD-10-CM

## 2023-05-22 LAB
ALBUMIN SERPL-MCNC: 3.8 G/DL (ref 3.4–5)
ALBUMIN/GLOB SERPL: 1.1 (ref 0.8–1.7)
ALP SERPL-CCNC: 90 U/L (ref 45–117)
ALT SERPL-CCNC: 20 U/L (ref 13–56)
ANION GAP SERPL CALC-SCNC: 2 MMOL/L (ref 3–18)
AST SERPL-CCNC: 11 U/L (ref 10–38)
BASOPHILS # BLD: 0.1 K/UL (ref 0–0.1)
BASOPHILS NFR BLD: 1 % (ref 0–2)
BILIRUB SERPL-MCNC: 0.3 MG/DL (ref 0.2–1)
BUN SERPL-MCNC: 7 MG/DL (ref 7–18)
BUN/CREAT SERPL: 9 (ref 12–20)
CALCIUM SERPL-MCNC: 9.3 MG/DL (ref 8.5–10.1)
CHLORIDE SERPL-SCNC: 104 MMOL/L (ref 100–111)
CO2 SERPL-SCNC: 31 MMOL/L (ref 21–32)
CREAT SERPL-MCNC: 0.77 MG/DL (ref 0.6–1.3)
DIFFERENTIAL METHOD BLD: NORMAL
EOSINOPHIL # BLD: 0 K/UL (ref 0–0.4)
EOSINOPHIL NFR BLD: 0 % (ref 0–5)
ERYTHROCYTE [DISTWIDTH] IN BLOOD BY AUTOMATED COUNT: 12.3 % (ref 11.6–14.5)
GLOBULIN SER CALC-MCNC: 3.6 G/DL (ref 2–4)
GLUCOSE SERPL-MCNC: 126 MG/DL (ref 74–99)
HCT VFR BLD AUTO: 40.4 % (ref 35–45)
HGB BLD-MCNC: 13.2 G/DL (ref 12–16)
IMM GRANULOCYTES # BLD AUTO: 0 K/UL (ref 0–0.04)
IMM GRANULOCYTES NFR BLD AUTO: 0 % (ref 0–0.5)
LYMPHOCYTES # BLD: 3.2 K/UL (ref 0.9–3.6)
LYMPHOCYTES NFR BLD: 30 % (ref 21–52)
MCH RBC QN AUTO: 28.1 PG (ref 24–34)
MCHC RBC AUTO-ENTMCNC: 32.7 G/DL (ref 31–37)
MCV RBC AUTO: 86.1 FL (ref 78–100)
MONOCYTES # BLD: 0.5 K/UL (ref 0.05–1.2)
MONOCYTES NFR BLD: 5 % (ref 3–10)
NEUTS SEG # BLD: 6.7 K/UL (ref 1.8–8)
NEUTS SEG NFR BLD: 63 % (ref 40–73)
NRBC # BLD: 0 K/UL (ref 0–0.01)
NRBC BLD-RTO: 0 PER 100 WBC
PLATELET # BLD AUTO: 404 K/UL (ref 135–420)
PMV BLD AUTO: 9.7 FL (ref 9.2–11.8)
POTASSIUM SERPL-SCNC: 4 MMOL/L (ref 3.5–5.5)
PROT SERPL-MCNC: 7.4 G/DL (ref 6.4–8.2)
RBC # BLD AUTO: 4.69 M/UL (ref 4.2–5.3)
SODIUM SERPL-SCNC: 137 MMOL/L (ref 136–145)
TSH SERPL DL<=0.05 MIU/L-ACNC: 1.05 UIU/ML (ref 0.36–3.74)
WBC # BLD AUTO: 10.6 K/UL (ref 4.6–13.2)

## 2023-05-22 PROCEDURE — 84443 ASSAY THYROID STIM HORMONE: CPT

## 2023-05-22 PROCEDURE — 85025 COMPLETE CBC W/AUTO DIFF WBC: CPT

## 2023-05-22 PROCEDURE — 36415 COLL VENOUS BLD VENIPUNCTURE: CPT

## 2023-05-22 PROCEDURE — 80053 COMPREHEN METABOLIC PANEL: CPT

## 2024-04-25 ENCOUNTER — HOSPITAL ENCOUNTER (OUTPATIENT)
Facility: HOSPITAL | Age: 34
Setting detail: RECURRING SERIES
Discharge: HOME | End: 2024-04-28
Payer: COMMERCIAL

## 2024-04-25 PROCEDURE — 97161 PT EVAL LOW COMPLEX 20 MIN: CPT

## 2024-04-25 NOTE — PROGRESS NOTES
T DAILY TREATMENT NOTE/VESTIBULAR EVAL     Patient Name: Alisha Posadas    Date: 2024    : 1990  Insurance: Payor: [employer] [facility] / Plan: [facility] [employer] [facility] / Product Type: *No Product type* /      Patient  verified yes     Visit #   Current / Total 1 12   Time   In / Out 8:00 8:40   Pain   In / Out 0/10 0/10   Subjective Functional Status/Changes: See below     Treatment Area: abnormalities of gait and mobility     SUBJECTIVE  Pain Level (0-10 scale): 0/10  []constant []intermittent []improving []worsening []no change since onset    Any medication changes, allergies to medications, adverse drug reactions, diagnosis change, or new procedure performed?: [x] No    [] Yes (see summary sheet for update)  Subjective functional status/changes:       Mechanism of Injury:  about a year ago dizziness started. Reports she just started having dizziness. Has worsened over time. Has to walk slow because going fast increases symptoms. Has had multiple near falls. Reports dizziness last a long time. Dizziness with rolling in bed. Laying down and getting up increases dizziness. Reports her eyes feel blurry when it happens. Gets nausea. Denies medication for dizziness. Reports dizziness is constant but less with laying down and laying still. Denies hearing loss. Denies ringing in ears. Denies double vision but difficulty focusing when dizziness. Wearing glasses makes dizziness worse. Unable to walk or go grocery shopping currently.   Work Hx: not currently working.   Pt Goals: to have less dizziness    OBJECTIVE/EXAMINATION      35 min [x]Eval    - untimed        Therapeutic Procedures:  Tx Min Billable or 1:1 Min (if diff from Tx Min) Procedure, Rationale, Specifics   5  81729 Therapeutic Exercise (timed):  increase ROM, strength, coordination, balance, and proprioception to improve patient's ability to progress to PLOF and address remaining functional goals. (see flow sheet as

## 2024-04-25 NOTE — THERAPY EVALUATION
[facility]  [address] [place] [address] Ph:[phone] Fx: [phone]  Plan of Care / Statement of Necessity for Physical Therapy Services     Patient Name: Alisha Posadas : 1990   Medical   Diagnosis: Dizziness and giddiness [R42] Treatment Diagnosis: R26.89  Abnormalities of gait and mobility and R42   Dizziness and giddiness      Onset Date: A year ago Payor :  Payor: [employer] [facility] / Plan: [facility] [employer] [facility] / Product Type: *No Product type* /    Referral Source: Mary Carmen Peter, APR* Start of Care (SOC): 2024   Prior Hospitalization: See medical history Provider #: [id number]   Prior Level of Function: Ind with ambulation, Ind with ADLs   Comorbidities: Anxiety, depression     Assessment / key information:    Pt. Is a 34 year old female c/o dizziness and decreased balance that began a year ago and has gradually worsened. She reports having to walk slow because going fast makes her dizzy and she has had multiple near falls. She reports dizziness also occurs with just sitting down and is best with laying down and not moving. She reports nausea and denies hearing loss. She denies double vision but reports difficulty focusing her eyes and her symptoms are worse with wearing her glasses. She presents with negative smooth pursuits and saccades but both increased eye strain and dizziness. She also has increased dizziness with slow VOR testing. FGA was incomplete secondary to poor tolerance and increased dizziness, so will finish next appointment. She has instability with Romberg with eyes closed and tandem stance but was able to perform for 30 seconds. Single leg balance was limited right: 10 seconds left: 16 seconds. Arias-Hallpike test was negative. She was educated on follow up with optometrist secondary to increased symptoms with her glasses. She may also benefit from follow up with neurologist if

## 2024-05-10 ENCOUNTER — HOSPITAL ENCOUNTER (OUTPATIENT)
Facility: HOSPITAL | Age: 34
Setting detail: RECURRING SERIES
Discharge: HOME | End: 2024-05-13
Payer: COMMERCIAL

## 2024-05-10 PROCEDURE — 97530 THERAPEUTIC ACTIVITIES: CPT

## 2024-05-10 PROCEDURE — 97110 THERAPEUTIC EXERCISES: CPT

## 2024-05-10 PROCEDURE — 97112 NEUROMUSCULAR REEDUCATION: CPT

## 2024-05-13 ENCOUNTER — HOSPITAL ENCOUNTER (OUTPATIENT)
Facility: HOSPITAL | Age: 34
Setting detail: RECURRING SERIES
Discharge: HOME | End: 2024-05-16
Payer: COMMERCIAL

## 2024-05-13 PROCEDURE — 97110 THERAPEUTIC EXERCISES: CPT

## 2024-05-13 PROCEDURE — 97112 NEUROMUSCULAR REEDUCATION: CPT

## 2024-05-13 PROCEDURE — 97116 GAIT TRAINING THERAPY: CPT

## 2024-05-13 NOTE — PROGRESS NOTES
units; 68-82 min = 5 units   Total Total     [x]  Patient Education billed concurrently with other procedures   [x] Review HEP    [] Progressed/Changed HEP, detail:    [] Other detail:       Objective Information/Functional Measures/Assessment  Reported moderate increase in dizziness with eye movements and significant increased with head movements  Static balance is improving  Cont to need cueing with sequence with SPC    Pt is making slow progress toward goals. Pt is amb with SPC and reported feeling more stable and less likely to fall with it. Cont with moderate to severe dizziness and pt reports is coming and going. Static balance cont to improve. Cont with decreased static balance in SLS bilaterally. Cont to need cueing for sequence with SPC    Patient will continue to benefit from skilled PT / OT services to modify and progress therapeutic interventions, analyze and address functional mobility deficits, analyze and address ROM deficits, analyze and address strength deficits, analyze and cue for proper movement patterns, analyze and modify for postural abnormalities, analyze and address imbalance/dizziness, and instruct in home and community integration to address functional deficits and attain remaining goals.    Progress toward goals / Updated goals:  [x]  See Progress Note/Recertification    hort Term Goals: To be accomplished in 1 weeks  Goal: Patient will demonstrate compliance with HEP in order to improve dizziness symptoms for increased ease of ADLs   Status at evaluation: n/a  Goal met. Pt reports compliance. 5/10/24     Long Term Goals: To be accomplished in 6 weeks  Goal: Patient will improve FOTO score by 7 points in order to demonstrate a significant improvement in function.   Status at evaluation/last progress note: 48 points     2.   Goal: Patient will improve FGA score by 4 points in order to demonstrate a significant improvement in function.   Status at evaluation/last progress note: unable to

## 2024-05-16 ENCOUNTER — HOSPITAL ENCOUNTER (OUTPATIENT)
Facility: HOSPITAL | Age: 34
Setting detail: RECURRING SERIES
Discharge: HOME | End: 2024-05-19
Payer: COMMERCIAL

## 2024-05-16 PROCEDURE — 97530 THERAPEUTIC ACTIVITIES: CPT

## 2024-05-16 PROCEDURE — 97110 THERAPEUTIC EXERCISES: CPT

## 2024-05-16 PROCEDURE — 97112 NEUROMUSCULAR REEDUCATION: CPT

## 2024-05-16 NOTE — PROGRESS NOTES
Education billed concurrently with other procedures   [x] Review HEP    [] Progressed/Changed HEP, detail:    [] Other detail:       Objective Information/Functional Measures/Assessment  Reported increased dizziness with vertical eye movements  Needed multiple seated rest breaks during standing exercises  Had increased dizziness with vertical nods    Pt is making limited progress toward goals. Pt cont with inconsistent reported dizziness level. Has begun to amb with SPC for most ambulation. Static balance has improved since eval. Cont to have difficulty with SLS bilaterally.     Patient will continue to benefit from skilled PT / OT services to modify and progress therapeutic interventions, analyze and address functional mobility deficits, analyze and address ROM deficits, analyze and address strength deficits, analyze and cue for proper movement patterns, analyze and modify for postural abnormalities, analyze and address imbalance/dizziness, and instruct in home and community integration to address functional deficits and attain remaining goals.    Progress toward goals / Updated goals:  [x]  See Progress Note/Recertification    short Term Goals: To be accomplished in 1 weeks  Goal: Patient will demonstrate compliance with HEP in order to improve dizziness symptoms for increased ease of ADLs   Status at evaluation: n/a  Goal met. Pt reports compliance. 5/10/24     Long Term Goals: To be accomplished in 6 weeks  Goal: Patient will improve FOTO score by 7 points in order to demonstrate a significant improvement in function.   Status at evaluation/last progress note: 48 points     2.   Goal: Patient will improve FGA score by 4 points in order to demonstrate a significant improvement in function.   Status at evaluation/last progress note: unable to complete secondary to dizziness     3.   Goal: Patient will improve B single leg balance to 20 seconds in order to increase ease of ambulation.   Status at evaluation/last

## 2024-05-20 ENCOUNTER — HOSPITAL ENCOUNTER (OUTPATIENT)
Facility: HOSPITAL | Age: 34
Setting detail: RECURRING SERIES
Discharge: HOME | End: 2024-05-23
Payer: COMMERCIAL

## 2024-05-20 PROCEDURE — 97110 THERAPEUTIC EXERCISES: CPT

## 2024-05-20 PROCEDURE — 97530 THERAPEUTIC ACTIVITIES: CPT

## 2024-05-20 PROCEDURE — 97112 NEUROMUSCULAR REEDUCATION: CPT

## 2024-05-20 NOTE — PROGRESS NOTES
PHYSICAL / OCCUPATIONAL THERAPY - DAILY TREATMENT NOTE    Patient Name: Alisha Posadas    Date: 2024    : 1990  Insurance: Payor: [employer] [facility] [place] / Plan: [facility] [employer] [facility] [place] / Product Type: *No Product type* /      Patient  verified Yes     Visit #   Current / Total 5 12   Time   In / Out 917 950   Pain   In / Out 9/10 8-9/10   Subjective Functional Status/Changes: Pt stated that her dizziness is still bad. Reported that she had increased dizziness this weekend after going to the store.      TREATMENT AREA =  Dizziness and giddiness [R42]     OBJECTIVE         Therapeutic Procedures:    Tx Min Billable or 1:1 Min (if diff from Tx Min) Procedure, Rationale, Specifics   10  97481 Therapeutic Exercise (timed):  increase ROM, strength, coordination, balance, and proprioception to improve patient's ability to progress to PLOF and address remaining functional goals. (see flow sheet as applicable)     Details if applicable:       13  15868 Neuromuscular Re-Education (timed):  improve balance, coordination, kinesthetic sense, posture, core stability and proprioception to improve patient's ability to develop conscious control of individual muscles and awareness of position of extremities in order to progress to PLOF and address remaining functional goals. (see flow sheet as applicable)     Details if applicable:     10  82180 Therapeutic Activity (timed):  use of dynamic activities replicating functional movements to increase ROM, strength, coordination, balance, and proprioception in order to improve patient's ability to progress to PLOF and address remaining functional goals.  (see flow sheet as applicable)     Details if applicable:     33  [facility] Totals Reminder: bill using total billable min of TIMED therapeutic procedures (example: do not include dry needle or estim unattended, both untimed codes, in totals to left)  8-22 min = 1 unit; 23-37 min = 2 units; 38-52 min = 3 units;

## 2024-05-24 ENCOUNTER — HOSPITAL ENCOUNTER (OUTPATIENT)
Facility: HOSPITAL | Age: 34
Setting detail: RECURRING SERIES
Discharge: HOME | End: 2024-05-27
Payer: COMMERCIAL

## 2024-05-24 PROCEDURE — 97530 THERAPEUTIC ACTIVITIES: CPT

## 2024-05-24 PROCEDURE — 97110 THERAPEUTIC EXERCISES: CPT

## 2024-05-24 PROCEDURE — 97112 NEUROMUSCULAR REEDUCATION: CPT

## 2024-05-24 NOTE — THERAPY RECERTIFICATION
[facility]  [address] [address] - Ph: [phone]   Fx: [phone]  PHYSICAL THERAPY PROGRESS NOTE  [x] Progress Note  [] Discharge Summary    Patient Name: Alisha Posadas : 1990   Treatment/Medical Diagnosis: Dizziness and giddiness [R42]   Referral Source: Mary Carmen Peter, APR*     Date of Initial Visit: 24 Attended Visits: 6 Missed Visits: 0         Prior Level of Function: Ind with ambulation, Ind with ADLs   Comorbidities: Anxiety, depression       SUMMARY OF TREATMENT  Pt. Is progressing slowly towards goals. She reports her dizziness severity continues to fluctuate but does have more good days now. FOTO score improved to 52 points. She continues to have decreased B single leg balance right: 12 seconds left: 17 seconds. She was able to complete FGA test today and scored 17/30. She also had improved tolerance with VOR exercises today. Skilled PT is medically necessary in order to continue to improve balance and dizziness for increased ease of ADLs and improved quality of life.     CURRENT STATUS/Progress towards Goals:    Short Term Goals: To be accomplished in 1 weeks  Goal: Patient will demonstrate compliance with HEP in order to improve dizziness symptoms for increased ease of ADLs   Status at evaluation: n/a   met     Long Term Goals: To be accomplished in 6 weeks  Goal: Patient will improve FOTO score by 7 points in order to demonstrate a significant improvement in function.   Status at evaluation/last progress note: 48 points   not met    2.   Goal: Patient will improve FGA score by 4 points in order to demonstrate a significant improvement in function.   Status at evaluation/last progress note: unable to complete secondary to dizziness   not met    3.   Goal: Patient will improve B single leg balance to 20 seconds in order to increase ease of ambulation.   Status at evaluation/last progress note:

## 2024-05-24 NOTE — PROGRESS NOTES
PHYSICAL / OCCUPATIONAL THERAPY - DAILY TREATMENT NOTE    Patient Name: Alisha Posadas    Date: 2024    : 1990  Insurance: Payor: [employer] [place] / Plan: [facility] [employer] [facility] / Product Type: *No Product type* /      Patient  verified Yes     Visit #   Current / Total 6 12   Time   In / Out 9:40 10:29   Pain   In / Out 0/10 0/10   Subjective Functional Status/Changes: Pt. Reports she had a good day yesterday and today dizziness is 5/10.      TREATMENT AREA =  Dizziness and giddiness [R42]     OBJECTIVE     Therapeutic Procedures:    Tx Min Billable or 1:1 Min (if diff from Tx Min) Procedure, Rationale, Specifics   8  24521 Therapeutic Exercise (timed):  increase ROM, strength, coordination, balance, and proprioception to improve patient's ability to progress to PLOF and address remaining functional goals. (see flow sheet as applicable)     Details if applicable:  see flow sheet     15  64195 Neuromuscular Re-Education (timed):  improve balance, coordination, kinesthetic sense, posture, core stability and proprioception to improve patient's ability to develop conscious control of individual muscles and awareness of position of extremities in order to progress to PLOF and address remaining functional goals. (see flow sheet as applicable)     Details if applicable:  standing balance activities    26  10899 Therapeutic Activity (timed):  use of dynamic activities replicating functional movements to increase ROM, strength, coordination, balance, and proprioception in order to improve patient's ability to progress to PLOF and address remaining functional goals.  (see flow sheet as applicable)     Details if applicable:  goal re-assessment, educated on VOR x1 for HEP          Details if applicable:            Details if applicable:     49  [facility] Totals Reminder: bill using total billable min of TIMED therapeutic procedures (example: do not include dry needle or estim unattended, both

## 2024-05-28 ENCOUNTER — HOSPITAL ENCOUNTER (OUTPATIENT)
Facility: HOSPITAL | Age: 34
Setting detail: RECURRING SERIES
Discharge: HOME | End: 2024-05-31
Payer: COMMERCIAL

## 2024-05-28 PROCEDURE — 97110 THERAPEUTIC EXERCISES: CPT

## 2024-05-28 PROCEDURE — 97530 THERAPEUTIC ACTIVITIES: CPT

## 2024-05-28 PROCEDURE — 97112 NEUROMUSCULAR REEDUCATION: CPT

## 2024-05-28 NOTE — PROGRESS NOTES
Continue Plan of Care  - Upgrade activities as tolerated    Delores Bishop PTA    5/28/2024    6:41 AM    Future Appointments   Date Time Provider Department Center   5/28/2024  9:20 AM Delores Bishop PTA [facility]   5/31/2024 10:00 AM Delores Bishop PTA [facility]   6/3/2024  9:20 AM Demarco Jenkins PT [facility]   6/6/2024  9:20 AM Demarco Jenkins PT [facility]   7/15/2024  8:20 AM Leon Hamlin MD [facility]

## 2024-05-31 ENCOUNTER — HOSPITAL ENCOUNTER (OUTPATIENT)
Facility: HOSPITAL | Age: 34
Setting detail: RECURRING SERIES
End: 2024-05-31
Payer: COMMERCIAL

## 2024-05-31 PROCEDURE — 97112 NEUROMUSCULAR REEDUCATION: CPT

## 2024-05-31 PROCEDURE — 97110 THERAPEUTIC EXERCISES: CPT

## 2024-05-31 PROCEDURE — 97530 THERAPEUTIC ACTIVITIES: CPT

## 2024-05-31 NOTE — PROGRESS NOTES
PHYSICAL / OCCUPATIONAL THERAPY - DAILY TREATMENT NOTE    Patient Name: Alisha Posadas    Date: 2024    : 1990  Insurance: Payor: [employer] [place] / Plan: [facility] [employer] [facility] / Product Type: *No Product type* /      Patient  verified Yes     Visit #   Current / Total 2 8   Time   In / Out 955 1030   Pain   In / Out -6/10 4/10   Subjective Functional Status/Changes: Pt stated that her dizziness is a little bit worse today     TREATMENT AREA =  Dizziness and giddiness [R42]     OBJECTIVE         Therapeutic Procedures:    Tx Min Billable or 1:1 Min (if diff from Tx Min) Procedure, Rationale, Specifics   10  95220 Therapeutic Exercise (timed):  increase ROM, strength, coordination, balance, and proprioception to improve patient's ability to progress to PLOF and address remaining functional goals. (see flow sheet as applicable)     Details if applicable:       17  15103 Neuromuscular Re-Education (timed):  improve balance, coordination, kinesthetic sense, posture, core stability and proprioception to improve patient's ability to develop conscious control of individual muscles and awareness of position of extremities in order to progress to PLOF and address remaining functional goals. (see flow sheet as applicable)     Details if applicable:     8  61428 Therapeutic Activity (timed):  use of dynamic activities replicating functional movements to increase ROM, strength, coordination, balance, and proprioception in order to improve patient's ability to progress to PLOF and address remaining functional goals.  (see flow sheet as applicable)     Details if applicable:     35  [facility] Totals Reminder: bill using total billable min of TIMED therapeutic procedures (example: do not include dry needle or estim unattended, both untimed codes, in totals to left)  8-22 min = 1 unit; 23-37 min = 2 units; 38-52 min = 3 units; 53-67 min = 4 units; 68-82 min = 5 units   Total Total     [x]  Patient

## 2024-06-03 ENCOUNTER — HOSPITAL ENCOUNTER (OUTPATIENT)
Facility: HOSPITAL | Age: 34
Setting detail: RECURRING SERIES
Discharge: HOME | End: 2024-06-06
Payer: COMMERCIAL

## 2024-06-03 PROCEDURE — 97112 NEUROMUSCULAR REEDUCATION: CPT

## 2024-06-03 PROCEDURE — 97110 THERAPEUTIC EXERCISES: CPT

## 2024-06-03 PROCEDURE — 97530 THERAPEUTIC ACTIVITIES: CPT

## 2024-06-03 NOTE — PROGRESS NOTES
applicable:     35  [facility] Totals Reminder: bill using total billable min of TIMED therapeutic procedures (example: do not include dry needle or estim unattended, both untimed codes, in totals to left)  8-22 min = 1 unit; 23-37 min = 2 units; 38-52 min = 3 units; 53-67 min = 4 units; 68-82 min = 5 units   Total Total     [x]  Patient Education billed concurrently with other procedures   [x] Review HEP    [] Progressed/Changed HEP, detail:    [] Other detail:       Objective Information/Functional Measures/Assessment    Increased dizziness with VOR x1  Increased dizziness with squatting   Discussed D/C plans and pt. Is agreeable to D/C next visit  SLS: right: 9 seconds left: 9 seconds    Assessment:  Pt. Is progressing slowly towards goals. She continues to have significant dizziness symptoms but reports having some more good days now. She demonstrates improving balance during exercises but continues to have difficulty with eyes closed. She also continues to demonstrate decreased single leg balance. Plan is to D/C next visit and will follow up with neurologist.       Patient will continue to benefit from skilled PT / OT services to modify and progress therapeutic interventions, analyze and address functional mobility deficits, analyze and address ROM deficits, analyze and address strength deficits, analyze and address soft tissue restrictions, analyze and cue for proper movement patterns, analyze and modify for postural abnormalities, and analyze and address imbalance/dizziness to address functional deficits and attain remaining goals.    Progress toward goals / Updated goals:  []  See Progress Note/Recertification    Goal: Patient will improve FOTO score by 7 points in order to demonstrate a significant improvement in function.   Status at evaluation/last progress note: 52 points     2.   Goal: Patient will improve FGA score by 4 points in order to demonstrate a significant improvement in function.   Status at

## 2024-06-06 ENCOUNTER — HOSPITAL ENCOUNTER (OUTPATIENT)
Facility: HOSPITAL | Age: 34
Setting detail: RECURRING SERIES
Discharge: HOME | End: 2024-06-09
Payer: COMMERCIAL

## 2024-06-06 PROCEDURE — 97110 THERAPEUTIC EXERCISES: CPT

## 2024-06-06 PROCEDURE — 97112 NEUROMUSCULAR REEDUCATION: CPT

## 2024-06-06 PROCEDURE — 97530 THERAPEUTIC ACTIVITIES: CPT

## 2024-06-06 NOTE — THERAPY DISCHARGE
[facility] - [facility]  [address] [place] [address]  [phone] fax    Physical Therapy Discharge Summary    Patient Name: Alisha Posadas : 1990   Medical   Diagnosis: Dizziness and giddiness [R42] Treatment Diagnosis: R26.89  Abnormalities of gait and mobility and R42   Dizziness and giddiness      Onset Date: A year ago Payor :  Payor: [employer] [place] / Plan: [facility] [employer] [facility] / Product Type: *No Product type* /    Referral Source: Mary Carmen Peter, APR* Start of Care (SOC): 2024   Prior Hospitalization: See medical history Provider #: [id number]   Prior Level of Function: Ind with ambulation, Ind with ADLs   Comorbidities: Anxiety, depression       Visits from Start of Care: 10    Missed Visits: 0    Reporting Period : 24 to 24    Summary of Care:  Goal: Patient will improve FOTO score by 7 points in order to demonstrate a significant improvement in function.   Status at evaluation/last progress note: 52 points  Status at discharge: met    Goal: Patient will improve FGA score by 4 points in order to demonstrate a significant improvement in function.   Status at evaluation/last progress note:   Status at discharge: met     Goal: Patient will improve B single leg balance to 20 seconds in order to increase ease of ambulation.   Status at evaluation/last progress note: right: 12 seconds left: 17 seconds   Status at discharge: not met    Goal: Patient will report a 50% improvement in symptoms since SOC in order to improve quality of life.  Status at evaluation/last progress note: fluctuating symptoms but is having some more good days  Status at discharge: met    Pt. Has progressed well with physical therapy. She reports a 50% improvement in her dizziness symptoms and FOTO score improved to 56 points indicating a significant improvement in function. FGA score also improved significantly to  indicating

## 2024-06-06 NOTE — PROGRESS NOTES
PHYSICAL / OCCUPATIONAL THERAPY - DAILY TREATMENT NOTE    Patient Name: Alisha Posadas    Date: 2024    : 1990  Insurance: Payor: [employer] [facility] / Plan: [facility] [employer] / Product Type: *No Product type* /      Patient  verified Yes     Visit #   Current / Total 4 8   Time   In / Out 9:20 9:55   Pain   In / Out 0/10 0/10   Subjective Functional Status/Changes: Pt. Reports she is doing pretty good today. She reports he walk yesterday went well. She is ready for D/C today.      TREATMENT AREA =  Dizziness and giddiness [R42]     OBJECTIVE    Therapeutic Procedures:    Tx Min Billable or 1:1 Min (if diff from Tx Min) Procedure, Rationale, Specifics   10  06967 Therapeutic Exercise (timed):  increase ROM, strength, coordination, balance, and proprioception to improve patient's ability to progress to PLOF and address remaining functional goals. (see flow sheet as applicable)     Details if applicable:  see flow sheet     8  88127 Neuromuscular Re-Education (timed):  improve balance, coordination, kinesthetic sense, posture, core stability and proprioception to improve patient's ability to develop conscious control of individual muscles and awareness of position of extremities in order to progress to PLOF and address remaining functional goals. (see flow sheet as applicable)     Details if applicable:  standing balance activities   17  43887 Therapeutic Activity (timed):  use of dynamic activities replicating functional movements to increase ROM, strength, coordination, balance, and proprioception in order to improve patient's ability to progress to PLOF and address remaining functional goals.  (see flow sheet as applicable)     Details if applicable:  goal re-assessment           Details if applicable:            Details if applicable:     35  [facility] Totals Reminder: bill using total billable min of TIMED therapeutic procedures (example: do not include dry needle or estim

## 2024-06-06 NOTE — PROGRESS NOTES
Physical Therapy Discharge Instructions      [facility] - [facility]  [address] [place] [address] [phone] fax    Patient: Alisha Posadas  : 1990      Continue Home Exercise Program 2 times per day for 4 weeks, then decrease to 3 times per week        Follow up with MD:     [] Upon completion of therapy     [x] As needed      Recommendations:     [x]   Return to activity with home program    []   Return to activity with the following modifications:       []Post Rehab Program    []Join Independent aquatic program     []Return to/join local gym      Additional Comments: Keep up the great work with exercises at home.

## 2024-07-15 ENCOUNTER — OFFICE VISIT (OUTPATIENT)
Age: 34
End: 2024-07-15
Payer: COMMERCIAL

## 2024-07-15 VITALS
DIASTOLIC BLOOD PRESSURE: 82 MMHG | HEART RATE: 82 BPM | WEIGHT: 182.6 LBS | TEMPERATURE: 98.3 F | SYSTOLIC BLOOD PRESSURE: 140 MMHG | HEIGHT: 61 IN | OXYGEN SATURATION: 97 % | BODY MASS INDEX: 34.48 KG/M2

## 2024-07-15 DIAGNOSIS — R26.81 UNSTEADINESS: ICD-10-CM

## 2024-07-15 DIAGNOSIS — R42 DIZZINESS: Primary | ICD-10-CM

## 2024-07-15 PROCEDURE — 99204 OFFICE O/P NEW MOD 45 MIN: CPT | Performed by: STUDENT IN AN ORGANIZED HEALTH CARE EDUCATION/TRAINING PROGRAM

## 2024-07-15 RX ORDER — LAMOTRIGINE 100 MG/1
100 TABLET ORAL DAILY
COMMUNITY
Start: 2024-06-17

## 2024-07-15 RX ORDER — IBUPROFEN 800 MG/1
TABLET ORAL
COMMUNITY
Start: 2023-01-31 | End: 2024-07-15

## 2024-07-15 RX ORDER — ESZOPICLONE 3 MG/1
3 TABLET, FILM COATED ORAL NIGHTLY
COMMUNITY
Start: 2024-06-17

## 2024-07-15 RX ORDER — ARIPIPRAZOLE 10 MG/1
10 TABLET ORAL DAILY
COMMUNITY
Start: 2024-06-17

## 2024-07-15 RX ORDER — BUSPIRONE HYDROCHLORIDE 10 MG/1
10 TABLET ORAL DAILY
COMMUNITY
Start: 2024-06-17

## 2024-07-15 ASSESSMENT — ENCOUNTER SYMPTOMS
SHORTNESS OF BREATH: 0
BACK PAIN: 0
COUGH: 0
NAUSEA: 0
VOMITING: 0

## 2024-07-15 NOTE — PROGRESS NOTES
Alisha Posadas is a 34 y.o. female .presents for Follow-up and New Patient (dizzy)  A 34 years old female patient with medical history of bipolar disorder and anxiety referred to for evaluation of dizziness of more than a year duration.  She came today with a family friend.  She said it started in April 2023.  Progressively getting worse.  Symptoms are when standing or walking.  She feels that there is air in her head; feels lightheaded.  Sometimes, might have spinning sensation.  Sways back-and-forth when walking.  Has near falls; no falls recently.  Her eyes become blurry and might have diplopia during the spell.  Used to have nausea previously; it is better now.  No episodes of vomiting.  When she is sitting or when she is in bed, no dizziness.  Better when she is sitting and turning her head fast, might experience the dizzy spells.  The dizziness continues if she continues to walk.  Attended physical therapy for 6 weeks between May and June 2024; had seen some benefit.  She might have palpitation and feels hot with the spells.  No eye-movement abnormalities.  No problems with her speech or swallowing.  No weakness or numbness over her extremities.  No headaches.  She does not have any changes in her hearing.  No tinnitus.  No ear fullness sensation.  No past history of head trauma.  No alcohol, drug, or tobacco abuse.  No diabetes, hypertension, or hyperlipidemia.  Had a CT scan of the head in 2012 for evaluation of headache which was normal.  She said she was evaluated by ENT and was told that the evaluation was normal.  She has depression/bipolar and anxiety; following with psychiatry.  Taking Abilify, BuSpar, Lamictal.  No suicidal ideations currently.    Review of Systems   Constitutional:  Negative for chills, fever and unexpected weight change.   HENT:  Negative for hearing loss and tinnitus.    Eyes:  Negative for visual disturbance (has glasses).   Respiratory:  Negative for cough and shortness of

## 2024-08-02 ENCOUNTER — HOSPITAL ENCOUNTER (OUTPATIENT)
Facility: HOSPITAL | Age: 34
Discharge: HOME | End: 2024-08-05
Attending: STUDENT IN AN ORGANIZED HEALTH CARE EDUCATION/TRAINING PROGRAM

## 2024-08-02 DIAGNOSIS — R26.81 UNSTEADINESS: ICD-10-CM

## 2024-08-02 DIAGNOSIS — R42 DIZZINESS: ICD-10-CM

## 2025-04-30 ENCOUNTER — TRANSCRIBE ORDERS (OUTPATIENT)
Facility: HOSPITAL | Age: 35
End: 2025-04-30

## 2025-04-30 ENCOUNTER — HOSPITAL ENCOUNTER (OUTPATIENT)
Facility: HOSPITAL | Age: 35
Discharge: HOME | End: 2025-05-03
Payer: COMMERCIAL

## 2025-04-30 DIAGNOSIS — Z00.00 LABORATORY EXAMINATION ORDERED AS PART OF A ROUTINE GENERAL MEDICAL EXAMINATION: Primary | ICD-10-CM

## 2025-04-30 DIAGNOSIS — Z00.00 LABORATORY EXAMINATION ORDERED AS PART OF A ROUTINE GENERAL MEDICAL EXAMINATION: ICD-10-CM

## 2025-04-30 LAB
ALBUMIN SERPL-MCNC: 4 G/DL (ref 3.4–5)
ALBUMIN/GLOB SERPL: 1.2 (ref 0.8–1.7)
ALP SERPL-CCNC: 109 U/L (ref 45–117)
ALT SERPL-CCNC: 12 U/L (ref 10–35)
ANION GAP SERPL CALC-SCNC: 12 MMOL/L (ref 3–18)
AST SERPL-CCNC: 14 U/L (ref 10–38)
BASOPHILS # BLD: 0.03 K/UL (ref 0–0.1)
BASOPHILS NFR BLD: 0.3 % (ref 0–2)
BILIRUB SERPL-MCNC: 0.5 MG/DL (ref 0.2–1)
BUN SERPL-MCNC: 10 MG/DL (ref 6–23)
BUN/CREAT SERPL: 16 (ref 12–20)
CALCIUM SERPL-MCNC: 9.9 MG/DL (ref 8.5–10.1)
CHLORIDE SERPL-SCNC: 98 MMOL/L (ref 98–107)
CHOLEST SERPL-MCNC: 253 MG/DL
CO2 SERPL-SCNC: 27 MMOL/L (ref 21–32)
CREAT SERPL-MCNC: 0.64 MG/DL (ref 0.6–1.3)
DIFFERENTIAL METHOD BLD: NORMAL
EOSINOPHIL # BLD: 0.07 K/UL (ref 0–0.4)
EOSINOPHIL NFR BLD: 0.7 % (ref 0–5)
ERYTHROCYTE [DISTWIDTH] IN BLOOD BY AUTOMATED COUNT: 12.4 % (ref 11.6–14.5)
EST. AVERAGE GLUCOSE BLD GHB EST-MCNC: 106 MG/DL
GLOBULIN SER CALC-MCNC: 3.4 G/DL (ref 2–4)
GLUCOSE SERPL-MCNC: 89 MG/DL (ref 74–108)
HBA1C MFR BLD: 5.3 % (ref 4.2–5.6)
HCT VFR BLD AUTO: 43.1 % (ref 35–45)
HDLC SERPL-MCNC: 44 MG/DL (ref 40–60)
HDLC SERPL: 5.8 (ref 0–5)
HGB BLD-MCNC: 14.3 G/DL (ref 12–16)
IMM GRANULOCYTES # BLD AUTO: 0.04 K/UL (ref 0–0.04)
IMM GRANULOCYTES NFR BLD AUTO: 0.4 % (ref 0–0.5)
LDLC SERPL CALC-MCNC: 191 MG/DL (ref 0–100)
LYMPHOCYTES # BLD: 3.33 K/UL (ref 0.9–3.6)
LYMPHOCYTES NFR BLD: 32.1 % (ref 21–52)
MCH RBC QN AUTO: 28 PG (ref 24–34)
MCHC RBC AUTO-ENTMCNC: 33.2 G/DL (ref 31–37)
MCV RBC AUTO: 84.5 FL (ref 78–100)
MONOCYTES # BLD: 0.57 K/UL (ref 0.05–1.2)
MONOCYTES NFR BLD: 5.5 % (ref 3–10)
NEUTS SEG # BLD: 6.33 K/UL (ref 1.8–8)
NEUTS SEG NFR BLD: 61 % (ref 40–73)
NRBC # BLD: 0 K/UL (ref 0–0.01)
NRBC BLD-RTO: 0 PER 100 WBC
PLATELET # BLD AUTO: 419 K/UL (ref 135–420)
PMV BLD AUTO: 9.8 FL (ref 9.2–11.8)
POTASSIUM SERPL-SCNC: 4 MMOL/L (ref 3.5–5.5)
PROT SERPL-MCNC: 7.4 G/DL (ref 6.4–8.2)
RBC # BLD AUTO: 5.1 M/UL (ref 4.2–5.3)
SODIUM SERPL-SCNC: 136 MMOL/L (ref 136–145)
T4 FREE SERPL-MCNC: 0.9 NG/DL (ref 0.9–1.7)
TRIGL SERPL-MCNC: 89 MG/DL (ref 0–150)
TSH, 3RD GENERATION: 1.11 UIU/ML (ref 0.27–4.2)
VLDLC SERPL CALC-MCNC: 18 MG/DL
WBC # BLD AUTO: 10.4 K/UL (ref 4.6–13.2)

## 2025-04-30 PROCEDURE — 84439 ASSAY OF FREE THYROXINE: CPT

## 2025-04-30 PROCEDURE — 83036 HEMOGLOBIN GLYCOSYLATED A1C: CPT

## 2025-04-30 PROCEDURE — 80061 LIPID PANEL: CPT

## 2025-04-30 PROCEDURE — 84443 ASSAY THYROID STIM HORMONE: CPT

## 2025-04-30 PROCEDURE — 85025 COMPLETE CBC W/AUTO DIFF WBC: CPT

## 2025-04-30 PROCEDURE — 36415 COLL VENOUS BLD VENIPUNCTURE: CPT

## 2025-04-30 PROCEDURE — 80053 COMPREHEN METABOLIC PANEL: CPT
